# Patient Record
Sex: FEMALE | Race: BLACK OR AFRICAN AMERICAN | NOT HISPANIC OR LATINO | Employment: FULL TIME | URBAN - METROPOLITAN AREA
[De-identification: names, ages, dates, MRNs, and addresses within clinical notes are randomized per-mention and may not be internally consistent; named-entity substitution may affect disease eponyms.]

---

## 2017-01-12 ENCOUNTER — OFFICE VISIT (OUTPATIENT)
Dept: OPHTHALMOLOGY | Facility: CLINIC | Age: 22
End: 2017-01-12
Payer: MEDICAID

## 2017-01-12 DIAGNOSIS — Z96.1 PSEUDOPHAKIA OF BOTH EYES: Primary | ICD-10-CM

## 2017-01-12 PROCEDURE — 99999 PR PBB SHADOW E&M-EST. PATIENT-LVL II: CPT | Mod: PBBFAC,,, | Performed by: OPTOMETRIST

## 2017-01-12 PROCEDURE — 99212 OFFICE O/P EST SF 10 MIN: CPT | Mod: PBBFAC,PO | Performed by: OPTOMETRIST

## 2017-01-12 PROCEDURE — 99024 POSTOP FOLLOW-UP VISIT: CPT | Mod: ,,, | Performed by: OPTOMETRIST

## 2017-01-12 NOTE — PROGRESS NOTES
HPI     Last CPG exam 12/15/2016  Last [facility] exam 09/27/2016  5 week PCIOL Check  Pseudophakia, OU  Diabetic         Last edited by Grover Conti MA on 1/12/2017  8:25 AM.         Assessment /Plan     For exam results, see Encounter Report.    Pseudophakia of both eyes      5 weeks post op after cataract surgery.  PIOL stable OU.  MR and spec Rx updated.  Discussed the need for near and distance correction.  RTC 6 months or prn.

## 2017-01-12 NOTE — MR AVS SNAPSHOT
[facility]  [address] [place] [address]  Phone: [phone]  Fax: [phone]                  Rubi Jose   2017 8:00 AM   Office Visit    Description:  Female : 1995   Provider:  EM John, OD   Department:  [facility]           Reason for Visit     Post-op Evaluation           Diagnoses this Visit        Comments    Pseudophakia of both eyes    -  Primary            To Do List           Future Appointments        Provider Department Dept Phone    3/15/2017 1:00 PM Mansi Swain Jr., PA-C [facility] [phone]    2017 8:45 AM EM John, JOSE ALEJANDRO [facility] [phone]      Goals (5 Years of Data)     None      Follow-Up and Disposition     Return in about 6 months (around 2017).      [facility]     [facility] Nurse Care Line -  Assistance  Registered nurses in the [facility] provide clinical advisement, health education, appointment booking, and other advisory services.  Call for this free service at [phone].             Medications           Message regarding Medications     Verify the changes and/or additions to your medication regime listed below are the same as discussed with your clinician today.  If any of these changes or additions are incorrect, please notify your healthcare provider.             Verify that the below list of medications is an accurate representation of the medications you are currently taking.  If none reported, the list may be blank. If incorrect, please contact your healthcare provider. Carry this list with you in case of emergency.           Current Medications     amitriptyline (ELAVIL) 25 MG tablet Take 25 mg by mouth.    folic acid-vit B6-vit B12 2.5-25-2 mg (FOLBIC OR EQUIV) 2.5-25-2 mg Tab Take 1 tablet by mouth once daily.    gabapentin (NEURONTIN) 400 MG capsule Take 1 capsule (400 mg total) by mouth 3 (three) times daily.     insulin aspart (NOVOLOG) 100 unit/mL InPn pen Inject 5 Units into the skin 3 (three) times daily.    insulin glargine, TOUJEO, (TOUJEO) 300 unit/mL (1.5 mL) InPn pen Inject 55 Units into the skin every evening.           Clinical Reference Information           Vital Signs - Last Recorded     LMP                   12/26/2016 (Exact Date)           Allergies as of 1/12/2017     No Known Allergies      Immunizations Administered on Date of Encounter - 1/12/2017     None

## 2017-03-15 ENCOUNTER — TELEPHONE (OUTPATIENT)
Dept: DIABETES | Facility: CLINIC | Age: 22
End: 2017-03-15

## 2017-03-15 ENCOUNTER — OFFICE VISIT (OUTPATIENT)
Dept: DIABETES | Facility: CLINIC | Age: 22
End: 2017-03-15
Payer: MEDICAID

## 2017-03-15 VITALS — SYSTOLIC BLOOD PRESSURE: 138 MMHG | DIASTOLIC BLOOD PRESSURE: 90 MMHG | WEIGHT: 153 LBS | BODY MASS INDEX: 23.26 KG/M2

## 2017-03-15 DIAGNOSIS — E34.9 NEUROPATHY ASSOCIATED WITH ENDOCRINE DISORDER: ICD-10-CM

## 2017-03-15 DIAGNOSIS — G63 NEUROPATHY ASSOCIATED WITH ENDOCRINE DISORDER: ICD-10-CM

## 2017-03-15 DIAGNOSIS — E13.9 LADA (LATENT AUTOIMMUNE DIABETES IN ADULTS), MANAGED AS TYPE 1: ICD-10-CM

## 2017-03-15 PROCEDURE — 99212 OFFICE O/P EST SF 10 MIN: CPT | Mod: PBBFAC,PO | Performed by: PHYSICIAN ASSISTANT

## 2017-03-15 PROCEDURE — 99999 PR PBB SHADOW E&M-EST. PATIENT-LVL II: CPT | Mod: PBBFAC,,, | Performed by: PHYSICIAN ASSISTANT

## 2017-03-15 PROCEDURE — 99214 OFFICE O/P EST MOD 30 MIN: CPT | Mod: S$PBB,,, | Performed by: PHYSICIAN ASSISTANT

## 2017-03-15 RX ORDER — INSULIN GLARGINE 300 [IU]/ML
60 INJECTION, SOLUTION SUBCUTANEOUS NIGHTLY
Qty: 18 ML | Refills: 3 | Status: SHIPPED | OUTPATIENT
Start: 2017-03-15 | End: 2017-03-15 | Stop reason: SDUPTHER

## 2017-03-15 RX ORDER — INSULIN LISPRO 100 [IU]/ML
10 INJECTION, SOLUTION INTRAVENOUS; SUBCUTANEOUS
Qty: 9 ML | Refills: 11 | Status: SHIPPED | OUTPATIENT
Start: 2017-03-15 | End: 2017-09-24 | Stop reason: SDUPTHER

## 2017-03-15 RX ORDER — INSULIN LISPRO 100 [IU]/ML
10 INJECTION, SOLUTION INTRAVENOUS; SUBCUTANEOUS
Qty: 9 ML | Refills: 11 | Status: SHIPPED | OUTPATIENT
Start: 2017-03-15 | End: 2017-03-15 | Stop reason: SDUPTHER

## 2017-03-15 RX ORDER — CLOBETASOL PROPIONATE 0.5 MG/G
CREAM TOPICAL 2 TIMES DAILY
Qty: 15 G | Refills: 1 | Status: SHIPPED | OUTPATIENT
Start: 2017-03-15 | End: 2017-03-15 | Stop reason: SDUPTHER

## 2017-03-15 RX ORDER — CLOBETASOL PROPIONATE 0.5 MG/G
CREAM TOPICAL 2 TIMES DAILY
Qty: 15 G | Refills: 1 | Status: SHIPPED | OUTPATIENT
Start: 2017-03-15 | End: 2017-03-27 | Stop reason: CLARIF

## 2017-03-15 RX ORDER — INSULIN GLARGINE 300 [IU]/ML
60 INJECTION, SOLUTION SUBCUTANEOUS NIGHTLY
Qty: 18 ML | Refills: 3 | Status: ON HOLD | OUTPATIENT
Start: 2017-03-15 | End: 2017-08-03 | Stop reason: HOSPADM

## 2017-03-15 NOTE — PROGRESS NOTES
Subjective:      Patient ID: Rubi Jose is a 21 y.o. female.    PCP: Carla Stone NP      Rubi Jose is a pleasant 21 y.o. female presenting to follow up on diabetes mellitus. She has had diabetes for 3 or more years. Her last visit in Diabetes Management was 12/14/2016 Since that time she has had mild improvement in her symptoms. Her blood sugar range fasting has been 400+ and 2 hour post meal has been not testing, and she has been monitoring 1 times per day as directed. Her current concerns are glycemic control. She has been out of her rapid acting insulin for 48 hours    She reports ICU hospital admissions in 10/16 and again in 12/16 with emergency room visits. She denies hypoglycemia, syncope, diaphoresis, chest pain, or dyspnea.    She has gained 13 pounds since last visit. Her BMI is 23.26    Her blood sugar in the clinic today was:   Lab Results   Component Value Date    POCGLU 423 (A) 03/15/2017       We discussed the American diabetes Association recommendations:  hemoglobin A1c below 7.0%; all diabetics should be on statins unless contraindicated; one aspirin daily unless contraindicated; fasting blood sugar between 80 and 130 mg/dL; postprandial blood sugar below 180 mg/dl; prevention of hypoglycemia, may adjust goals to higher levels if persistent; ACE or ARB therapy if not contraindicated; and maintain in an ideal body weight with BMI below 25.    Rubi is noncompliant much of the time with DM medications.     Rubi is noncompliant much of the time with lifestyle modifications to include activity and meal planning.       STANDARDS OF CARE:  Eye doctor: Dr. Ennis, last exam 12/8/2016.  Dental exam: Recommend regular exams; denies gums bleeding.  Podiatry doctor: Dileep     ACTIVITY LEVEL: She exercises rarely.  MEAL PLANNING: Number of meals per day - 3. Number of snacks per day - 2.  Per dietary recall, patient is not limiting carbohydrates, saturated fats and sodium.   BLOOD  GLUCOSE TESTING: Self-monitoring with   SOCIAL HISTORY: single. Lives with partner / significant other. unemployed no tobacco use    The following results were reviewed with patient.    Lab Results   Component Value Date    WBC 5.40 12/27/2016    HGB 13.0 12/27/2016    HCT 37.2 12/27/2016     (H) 12/27/2016    CHOL 156 12/27/2016    TRIG 184 (H) 12/27/2016    HDL 43 12/27/2016    ALT 16 12/26/2016    AST 17 12/26/2016     12/28/2016    K 3.6 12/28/2016     12/28/2016    CREATININE 0.46 (L) 12/28/2016    ESTGFRAFRICA >60 12/28/2016    EGFRNONAA >60 12/28/2016    BUN 9 12/28/2016    CO2 21 (L) 12/28/2016    TSH 0.494 12/27/2016     (H) 12/28/2016       Lab Results   Component Value Date    HGBA1C 11.4 (H) 12/27/2016    HGBA1C 14.8 (H) 07/25/2016    HGBA1C 14.6 (H) 05/12/2016       Lab Results   Component Value Date    GLUTAMICACID 3.11 (H) 03/18/2016    CPEPTIDE 0.6 (L) 03/18/2016       Lab Results   Component Value Date    T3FREE 1.8 (L) 03/18/2016     Lab Results   Component Value Date    FREET4 1.11 03/18/2016     Lab Results   Component Value Date    TSH 0.494 12/27/2016     Lab Results   Component Value Date    THYROPEROXID <6.0 03/18/2016     Lab Results   Component Value Date    WCMGLYEW06 1065 (H) 09/13/2016       Lab Results   Component Value Date    CALCIUM 7.9 (L) 12/28/2016    PHOS 3.0 12/28/2016       Review of patient's allergies indicates:  No Known Allergies    Past Medical History:   Diagnosis Date    Cataract     Diabetes mellitus, type 2     GERD (gastroesophageal reflux disease)        Review of Systems   Constitutional: Negative.  Negative for activity change, appetite change, chills, diaphoresis, fatigue, fever and unexpected weight change.   HENT: Negative.  Negative for congestion, dental problem, drooling, ear discharge, ear pain, facial swelling, hearing loss, mouth sores, nosebleeds, postnasal drip, rhinorrhea, sinus pressure, sneezing, sore throat, tinnitus,  trouble swallowing and voice change.    Eyes: Negative.  Negative for photophobia, pain, discharge, redness, itching and visual disturbance.   Respiratory: Negative.  Negative for apnea, cough, choking, chest tightness, shortness of breath, wheezing and stridor.    Cardiovascular: Negative.  Negative for chest pain, palpitations and leg swelling.   Gastrointestinal: Negative.  Negative for abdominal distention, abdominal pain, anal bleeding, blood in stool, constipation, diarrhea, nausea, rectal pain and vomiting.   Endocrine: Negative.  Negative for cold intolerance, heat intolerance, polydipsia, polyphagia and polyuria.   Genitourinary: Negative.  Negative for decreased urine volume, difficulty urinating, dyspareunia, dysuria, enuresis, flank pain, frequency, genital sores, hematuria, menstrual problem, pelvic pain, urgency, vaginal bleeding, vaginal discharge and vaginal pain.   Musculoskeletal: Negative.  Negative for arthralgias, back pain, gait problem, joint swelling, myalgias, neck pain and neck stiffness.   Skin: Negative.  Negative for color change, pallor, rash and wound.   Allergic/Immunologic: Negative.  Negative for environmental allergies, food allergies and immunocompromised state.   Neurological: Negative.  Negative for dizziness, tremors, seizures, syncope, facial asymmetry, speech difficulty, weakness, light-headedness, numbness and headaches.   Hematological: Negative.  Negative for adenopathy. Does not bruise/bleed easily.   Psychiatric/Behavioral: Negative.  Negative for agitation, behavioral problems, confusion, decreased concentration, dysphoric mood, hallucinations, self-injury, sleep disturbance and suicidal ideas. The patient is not nervous/anxious and is not hyperactive.       Objective:     Vitals - 1 value per visit 12/28/2016 12/29/2016 3/15/2017   SYSTOLIC - 125 138   DIASTOLIC - 85 90   PULSE - 83 -   TEMPERATURE - 98.6 -   RESPIRATIONS - 19 -   SPO2 - 99 -   Weight (lb) 140.43 - 153    HEIGHT - - -   BODY MASS INDEX - - 23.26   VISIT REPORT - - -   Pain Score  - - 0       Physical Exam   Constitutional: She is oriented to person, place, and time. She appears well-developed and well-nourished. She is cooperative.  Non-toxic appearance. She does not have a sickly appearance. She does not appear ill. No distress. She is not intubated.   HENT:   Head: Normocephalic and atraumatic. Not macrocephalic and not microcephalic. Head is without raccoon's eyes, without Cisse's sign, without abrasion, without contusion, without laceration, without right periorbital erythema and without left periorbital erythema. Hair is normal.   Right Ear: Hearing, tympanic membrane, external ear and ear canal normal. No lacerations. No drainage, swelling or tenderness. No foreign bodies. No mastoid tenderness. Tympanic membrane is not injected, not scarred, not perforated, not erythematous, not retracted and not bulging. Tympanic membrane mobility is normal. No middle ear effusion. No hemotympanum. No decreased hearing is noted.   Left Ear: Hearing, tympanic membrane, external ear and ear canal normal. No lacerations. No drainage, swelling or tenderness. No foreign bodies. No mastoid tenderness. Tympanic membrane is not injected, not scarred, not perforated, not erythematous, not retracted and not bulging. Tympanic membrane mobility is normal.  No middle ear effusion. No hemotympanum. No decreased hearing is noted.   Nose: Nose normal. No mucosal edema, rhinorrhea, nose lacerations, sinus tenderness, nasal deformity, septal deviation or nasal septal hematoma. No epistaxis.  No foreign bodies. Right sinus exhibits no maxillary sinus tenderness and no frontal sinus tenderness. Left sinus exhibits no maxillary sinus tenderness and no frontal sinus tenderness.   Mouth/Throat: Oropharynx is clear and moist. No oropharyngeal exudate.   Eyes: Conjunctivae and EOM are normal. Pupils are equal, round, and reactive to light. Right  eye exhibits no chemosis, no discharge and no exudate. No foreign body present in the right eye. Left eye exhibits no chemosis, no discharge, no exudate and no hordeolum. No foreign body present in the left eye. Right conjunctiva is not injected. Right conjunctiva has no hemorrhage. Left conjunctiva is not injected. Left conjunctiva has no hemorrhage. No scleral icterus. Right eye exhibits normal extraocular motion and no nystagmus. Left eye exhibits normal extraocular motion and no nystagmus. Right pupil is round and reactive. Left pupil is round and reactive. Pupils are equal.   Neck: Trachea normal, normal range of motion and full passive range of motion without pain. Neck supple. Normal carotid pulses, no hepatojugular reflux and no JVD present. No tracheal tenderness, no spinous process tenderness and no muscular tenderness present. Carotid bruit is not present. No rigidity. No tracheal deviation, no edema, no erythema and normal range of motion present. No thyroid mass and no thyromegaly present.   Cardiovascular: Normal rate, regular rhythm, normal heart sounds and intact distal pulses.   No extrasystoles are present. PMI is not displaced.  Exam reveals no gallop, no friction rub and no decreased pulses.    No murmur heard.  Pulses:       Dorsalis pedis pulses are 2+ on the right side, and 2+ on the left side.        Posterior tibial pulses are 2+ on the right side, and 2+ on the left side.   Pulmonary/Chest: Effort normal and breath sounds normal. No accessory muscle usage or stridor. No apnea, no tachypnea and no bradypnea. She is not intubated. No respiratory distress. She has no decreased breath sounds. She has no wheezes. She has no rhonchi. She has no rales. Chest wall is not dull to percussion. She exhibits no mass, no tenderness, no bony tenderness, no laceration, no crepitus, no edema, no deformity, no swelling and no retraction.   Abdominal: Soft. Normal appearance and bowel sounds are normal. She  exhibits no shifting dullness, no distension, no pulsatile liver, no fluid wave, no abdominal bruit, no ascites, no pulsatile midline mass and no mass. There is no hepatosplenomegaly, splenomegaly or hepatomegaly. There is no tenderness. There is no rigidity, no rebound, no guarding, no CVA tenderness, no tenderness at McBurney's point and negative Juarez's sign.   Musculoskeletal: Normal range of motion. She exhibits no edema or tenderness.        Right foot: There is normal range of motion and no deformity.        Left foot: There is normal range of motion and no deformity.   Feet:   Right Foot:   Protective Sensation: 5 sites tested. 5 sites sensed.   Skin Integrity: Negative for ulcer, blister, skin breakdown, erythema, warmth, callus or dry skin.   Left Foot:   Protective Sensation: 5 sites tested. 5 sites sensed.   Skin Integrity: Negative for ulcer, blister, skin breakdown, erythema, warmth, callus or dry skin.   Lymphadenopathy:        Head (right side): No submental, no submandibular, no tonsillar, no preauricular, no posterior auricular and no occipital adenopathy present.        Head (left side): No submental, no submandibular, no tonsillar, no preauricular, no posterior auricular and no occipital adenopathy present.     She has no cervical adenopathy.        Right cervical: No superficial cervical, no deep cervical and no posterior cervical adenopathy present.       Left cervical: No superficial cervical, no deep cervical and no posterior cervical adenopathy present.     She has no axillary adenopathy.   Neurological: She is alert and oriented to person, place, and time. She has normal reflexes. She is not disoriented. She displays no atrophy, no tremor and normal reflexes. No cranial nerve deficit or sensory deficit. She exhibits normal muscle tone. She displays no seizure activity. Coordination and gait normal.   Reflex Scores:       Bicep reflexes are 2+ on the right side and 2+ on the left side.        Brachioradialis reflexes are 2+ on the right side and 2+ on the left side.       Patellar reflexes are 2+ on the right side and 2+ on the left side.  Skin: Skin is warm and dry. No abrasion, no bruising, no burn, no ecchymosis, no laceration, no lesion, no petechiae, no purpura and no rash noted. Rash is not macular, not papular, not maculopapular, not nodular, not pustular, not vesicular and not urticarial. She is not diaphoretic. No cyanosis or erythema. No pallor. Nails show no clubbing.   Psychiatric: She has a normal mood and affect. Her behavior is normal. Judgment and thought content normal. Her mood appears not anxious. Her affect is not angry, not blunt and not labile. Her speech is not rapid and/or pressured, not delayed, not tangential and not slurred. She is not agitated, not aggressive, not hyperactive, not slowed, not withdrawn, not actively hallucinating and not combative. Thought content is not paranoid and not delusional. Cognition and memory are not impaired. She does not express impulsivity or inappropriate judgment. She does not exhibit a depressed mood. She expresses no homicidal and no suicidal ideation. She expresses no suicidal plans and no homicidal plans. She is communicative. She exhibits normal recent memory and normal remote memory. She is attentive.   Nursing note and vitals reviewed.    Assessment:     1. Uncontrolled type 1 diabetes mellitus with diabetic polyneuropathy    2. 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 Neuropathy associated with endocrine disorder       Plan:   Rubi Jose is seen today for   1. Uncontrolled type 1 diabetes mellitus with diabetic polyneuropathy    2. 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 Neuropathy associated with endocrine disorder      We have discussed the etiology and treatment options associated with the diagnosis as well as alternatives. I have changed her Novolog to Humalog as per  formulary changes.  She has elected the following treatments.     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  Dispense: 15 g; Refill: 1  -     insulin lispro (HUMALOG KWIKPEN) 100 unit/mL InPn pen; Inject 10 Units into the skin 3 (three) times daily with meals.  Dispense: 9 mL; Refill: 11  -     insulin glargine, TOUJEO, (TOUJEO) 300 unit/mL (1.5 mL) InPn pen; Inject 60 Units into the skin every evening.  Dispense: 18 mL; Refill: 3    Neuropathy associated with endocrine disorder  -     insulin glargine, TOUJEO, (TOUJEO) 300 unit/mL (1.5 mL) InPn pen; Inject 60 Units into the skin every evening.  Dispense: 18 mL; Refill: 3    1.) Patient was instructed to monitor blood glucose twice daily, fasting, and 2 hour post meal; if on Multiple Daily Injections (MDI) she will need to have pre-meal blood glucose as well. Reminded to bring BG meter or record to each visit for review.  2.) Reviewed pathophysiology of type 2 diabetes, complications related to the disease, importance of annual dilated eye exam and self daily foot examination.  3.) Continue medications as prescribed Toujeo; Humalog. [facility] MyChart or Phone review in 1 week with BG records for adjustment of medication.  4.) Reviewed carb counting, portion control, importance of spacing meals throughout the day to prevent post prandial elevations. Recommended low saturated fat, low sodium diet to aid in control of hypertension and cholesterol.  5.) Discussed activity, benefits, methods, and precautions. Recommended patient start/continue some form of exercise and increase as tolerated to 60 minutes per day to facilitate weight loss and aid in control of BGs. Also reminded patient of WHO recommendation of 10,000 steps daily as a goal.   6.) A1C, TSH, Lipid Panel, CMP with eGFR and Micro/Creatinine per ADA protocol.  7.) Return to clinic in  1.5 months for follow up. Advised patient to call clinic with any questions or concerns.    A total of 40 minutes was spent in face to face time, of which 50 % was spent in counseling patient on disease process, complications, treatment, and side effects of medications.    The patient was explained the above plan and given opportunity to ask questions.  She understands, chooses and consents to this plan and accepts all the risks, which include but are not limited to the risks mentioned above.   She understands the alternative of having no testing, interventions or treatments at this time. She left content and without further questions.

## 2017-03-15 NOTE — TELEPHONE ENCOUNTER
----- Message from Rosario Maurice sent at 3/14/2017  3:58 PM CDT -----  Pt is requesting a call from nurse to discuss a refill on the humalaog stick pin.            Please call pt back at [phone]

## 2017-03-27 RX ORDER — CLOBETASOL PROPIONATE 0.5 MG/G
EMULSION TOPICAL
Qty: 15 G | Refills: 0 | Status: SHIPPED | OUTPATIENT
Start: 2017-03-27 | End: 2017-04-24 | Stop reason: SDUPTHER

## 2017-04-24 RX ORDER — CLOBETASOL PROPIONATE 0.5 MG/G
EMULSION TOPICAL
Qty: 15 G | Refills: 0 | Status: SHIPPED | OUTPATIENT
Start: 2017-04-24 | End: 2017-06-14 | Stop reason: ALTCHOICE

## 2017-06-14 ENCOUNTER — OFFICE VISIT (OUTPATIENT)
Dept: DIABETES | Facility: CLINIC | Age: 22
End: 2017-06-14
Payer: MEDICAID

## 2017-06-14 VITALS
DIASTOLIC BLOOD PRESSURE: 72 MMHG | HEIGHT: 68 IN | SYSTOLIC BLOOD PRESSURE: 106 MMHG | WEIGHT: 159.63 LBS | BODY MASS INDEX: 24.19 KG/M2

## 2017-06-14 DIAGNOSIS — E13.9 LADA (LATENT AUTOIMMUNE DIABETES OF ADULTHOOD), MANAGED AS TYPE 1: Primary | ICD-10-CM

## 2017-06-14 LAB — GLUCOSE SERPL-MCNC: 82 MG/DL (ref 70–110)

## 2017-06-14 PROCEDURE — 82948 REAGENT STRIP/BLOOD GLUCOSE: CPT | Mod: PBBFAC,PO | Performed by: PHYSICIAN ASSISTANT

## 2017-06-14 PROCEDURE — 99999 PR PBB SHADOW E&M-EST. PATIENT-LVL III: CPT | Mod: PBBFAC,,, | Performed by: PHYSICIAN ASSISTANT

## 2017-06-14 PROCEDURE — 99213 OFFICE O/P EST LOW 20 MIN: CPT | Mod: PBBFAC,PO | Performed by: PHYSICIAN ASSISTANT

## 2017-06-14 PROCEDURE — 99214 OFFICE O/P EST MOD 30 MIN: CPT | Mod: S$PBB,,, | Performed by: PHYSICIAN ASSISTANT

## 2017-06-14 PROCEDURE — 3046F HEMOGLOBIN A1C LEVEL >9.0%: CPT | Mod: ,,, | Performed by: PHYSICIAN ASSISTANT

## 2017-06-14 RX ORDER — FLUOCINONIDE CREAM (EMULSIFIED BASE) 0.5 MG/G
CREAM TOPICAL 2 TIMES DAILY
Qty: 30 G | Refills: 1 | Status: SHIPPED | OUTPATIENT
Start: 2017-06-14 | End: 2017-07-30

## 2017-06-14 NOTE — PROGRESS NOTES
Subjective:      Patient ID: Rubi Jose is a 21 y.o. female.    PCP: Carla Stone NP      Rubi Jose is a pleasant 21 y.o. female presenting to follow up on diabetes mellitus. She has had diabetes for 3 or more years. Her last visit in Diabetes Management was 3/15/2017 Since that time she has had moderate improvement in her glycemia. Her blood sugar range fasting has been 165-180 and 2 hour post meal has been 200-300+, and she has been monitoring 2 times per day as directed. Her current concerns are glycemic control and medication refill.    She denies any hospital admissions, emergency room visits, hypoglycemia, syncope, diaphoresis, chest pain, or dyspnea.    She has gained 6 pounds since last visit. Her BMI is 24.27    Her blood sugar in the clinic today was:   Lab Results   Component Value Date    POCGLU 82 06/14/2017       We discussed the American diabetes Association recommendations:  hemoglobin A1c below 7.0%; all diabetics should be on statins unless contraindicated; one aspirin daily unless contraindicated; fasting blood sugar between 80 and 130 mg/dL; postprandial blood sugar below 180 mg/dl; prevention of hypoglycemia, may adjust goals to higher levels if persistent; ACE or ARB therapy if not contraindicated; and maintain in an ideal body weight with BMI below 25.    Rubi is compliant most of the time with DM medications.     Rubi is compliant most of the time with lifestyle modifications to include activity and meal planning.       STANDARDS OF CARE:  Eye doctor: Dr. Ennis, last exam 12/8/2016.  Dental exam: Recommend regular exams; denies gums bleeding.  Podiatry doctor: Dileep     ACTIVITY LEVEL: She exercises rarely.  MEAL PLANNING: Number of meals per day - 3. Number of snacks per day - 2.  Per dietary recall, patient is not limiting carbohydrates, saturated fats and sodium.   BLOOD GLUCOSE TESTING: Self-monitoring with   SOCIAL HISTORY: single. Lives with partner /  significant other. unemployed no tobacco use    The following results were reviewed with patient.    Lab Results   Component Value Date    WBC 5.40 12/27/2016    HGB 13.0 12/27/2016    HCT 37.2 12/27/2016     (H) 12/27/2016    CHOL 156 12/27/2016    TRIG 184 (H) 12/27/2016    HDL 43 12/27/2016    ALT 16 12/26/2016    AST 17 12/26/2016     12/28/2016    K 3.6 12/28/2016     12/28/2016    CREATININE 0.46 (L) 12/28/2016    ESTGFRAFRICA >60 12/28/2016    EGFRNONAA >60 12/28/2016    BUN 9 12/28/2016    CO2 21 (L) 12/28/2016    TSH 0.494 12/27/2016     (H) 12/28/2016       Lab Results   Component Value Date    HGBA1C 11.4 (H) 12/27/2016    HGBA1C 14.8 (H) 07/25/2016    HGBA1C 14.6 (H) 05/12/2016       Lab Results   Component Value Date    GLUTAMICACID 3.11 (H) 03/18/2016    CPEPTIDE 0.6 (L) 03/18/2016       Lab Results   Component Value Date    T3FREE 1.8 (L) 03/18/2016     Lab Results   Component Value Date    FREET4 1.11 03/18/2016     Lab Results   Component Value Date    TSH 0.494 12/27/2016     Lab Results   Component Value Date    THYROPEROXID <6.0 03/18/2016     Lab Results   Component Value Date    MSDSYBSD80 1065 (H) 09/13/2016     Lab Results   Component Value Date    CALCIUM 7.9 (L) 12/28/2016    PHOS 3.0 12/28/2016       Review of patient's allergies indicates:  No Known Allergies    Past Medical History:   Diagnosis Date    Cataract     Diabetes mellitus, type 2     GERD (gastroesophageal reflux disease)        Review of Systems   Constitutional: Negative.  Negative for activity change, appetite change, chills, diaphoresis, fatigue, fever and unexpected weight change.   HENT: Negative.  Negative for congestion, dental problem, drooling, ear discharge, ear pain, facial swelling, hearing loss, mouth sores, nosebleeds, postnasal drip, rhinorrhea, sinus pressure, sneezing, sore throat, tinnitus, trouble swallowing and voice change.    Eyes: Negative.  Negative for photophobia, pain,  "discharge, redness, itching and visual disturbance.   Respiratory: Negative.  Negative for apnea, cough, choking, chest tightness, shortness of breath, wheezing and stridor.    Cardiovascular: Negative.  Negative for chest pain, palpitations and leg swelling.   Gastrointestinal: Negative.  Negative for abdominal distention, abdominal pain, anal bleeding, blood in stool, constipation, diarrhea, nausea, rectal pain and vomiting.   Endocrine: Negative.  Negative for cold intolerance, heat intolerance, polydipsia, polyphagia and polyuria.   Genitourinary: Negative.  Negative for decreased urine volume, difficulty urinating, dyspareunia, dysuria, enuresis, flank pain, frequency, genital sores, hematuria, menstrual problem, pelvic pain, urgency, vaginal bleeding, vaginal discharge and vaginal pain.   Musculoskeletal: Negative.  Negative for arthralgias, back pain, gait problem, joint swelling, myalgias, neck pain and neck stiffness.   Skin: Negative.  Negative for color change, pallor, rash and wound.   Allergic/Immunologic: Negative.  Negative for environmental allergies, food allergies and immunocompromised state.   Neurological: Positive for numbness. Negative for dizziness, tremors, seizures, syncope, facial asymmetry, speech difficulty, weakness, light-headedness and headaches.   Hematological: Negative.  Negative for adenopathy. Does not bruise/bleed easily.   Psychiatric/Behavioral: Negative.  Negative for agitation, behavioral problems, confusion, decreased concentration, dysphoric mood, hallucinations, self-injury, sleep disturbance and suicidal ideas. The patient is not nervous/anxious and is not hyperactive.       Objective:     Vitals - 1 value per visit 12/29/2016 3/15/2017 6/14/2017   SYSTOLIC 125 138 106   DIASTOLIC 85 90 72   PULSE 83 - -   TEMPERATURE 98.6 - -   RESPIRATIONS 19 - -   SPO2 99 - -   Weight (lb) - 153 159.61   Weight (kg) - 69.4 72.4   HEIGHT - - 5' 8"   BODY MASS INDEX - 23.26 24.27   VISIT " REPORT - - -   Pain Score  - 0 -       Physical Exam   Constitutional: She is oriented to person, place, and time. She appears well-developed and well-nourished. She is cooperative.  Non-toxic appearance. She does not have a sickly appearance. She does not appear ill. No distress. She is not intubated.   HENT:   Head: Normocephalic and atraumatic. Not macrocephalic and not microcephalic. Head is without raccoon's eyes, without Cisse's sign, without abrasion, without contusion, without laceration, without right periorbital erythema and without left periorbital erythema. Hair is normal.   Right Ear: Hearing, tympanic membrane, external ear and ear canal normal. No lacerations. No drainage, swelling or tenderness. No foreign bodies. No mastoid tenderness. Tympanic membrane is not injected, not scarred, not perforated, not erythematous, not retracted and not bulging. Tympanic membrane mobility is normal. No middle ear effusion. No hemotympanum. No decreased hearing is noted.   Left Ear: Hearing, tympanic membrane, external ear and ear canal normal. No lacerations. No drainage, swelling or tenderness. No foreign bodies. No mastoid tenderness. Tympanic membrane is not injected, not scarred, not perforated, not erythematous, not retracted and not bulging. Tympanic membrane mobility is normal.  No middle ear effusion. No hemotympanum. No decreased hearing is noted.   Nose: Nose normal. No mucosal edema, rhinorrhea, nose lacerations, sinus tenderness, nasal deformity, septal deviation or nasal septal hematoma. No epistaxis.  No foreign bodies. Right sinus exhibits no maxillary sinus tenderness and no frontal sinus tenderness. Left sinus exhibits no maxillary sinus tenderness and no frontal sinus tenderness.   Mouth/Throat: Oropharynx is clear and moist. No oropharyngeal exudate.   Eyes: Conjunctivae and EOM are normal. Pupils are equal, round, and reactive to light. Right eye exhibits no chemosis, no discharge and no  exudate. No foreign body present in the right eye. Left eye exhibits no chemosis, no discharge, no exudate and no hordeolum. No foreign body present in the left eye. Right conjunctiva is not injected. Right conjunctiva has no hemorrhage. Left conjunctiva is not injected. Left conjunctiva has no hemorrhage. No scleral icterus. Right eye exhibits normal extraocular motion and no nystagmus. Left eye exhibits normal extraocular motion and no nystagmus. Right pupil is round and reactive. Left pupil is round and reactive. Pupils are equal.   Fundoscopic exam:       The right eye shows no arteriolar narrowing, no AV nicking, no exudate, no hemorrhage and no papilledema. The right eye shows red reflex and venous pulsations.        The left eye shows no arteriolar narrowing, no AV nicking, no exudate, no hemorrhage and no papilledema. The left eye shows red reflex and venous pulsations.   Neck: Trachea normal, normal range of motion and full passive range of motion without pain. Neck supple. Normal carotid pulses, no hepatojugular reflux and no JVD present. No tracheal tenderness, no spinous process tenderness and no muscular tenderness present. Carotid bruit is not present. No no neck rigidity. No tracheal deviation, no edema, no erythema and normal range of motion present. No thyroid mass and no thyromegaly present.   Cardiovascular: Normal rate, regular rhythm, normal heart sounds and intact distal pulses.   No extrasystoles are present. PMI is not displaced.  Exam reveals no gallop, no friction rub and no decreased pulses.    No murmur heard.  Pulses:       Dorsalis pedis pulses are 2+ on the right side, and 2+ on the left side.        Posterior tibial pulses are 2+ on the right side, and 2+ on the left side.   Pulmonary/Chest: Effort normal and breath sounds normal. No accessory muscle usage or stridor. No apnea, no tachypnea and no bradypnea. She is not intubated. No respiratory distress. She has no decreased breath  sounds. She has no wheezes. She has no rhonchi. She has no rales. Chest wall is not dull to percussion. She exhibits no mass, no tenderness, no bony tenderness, no laceration, no crepitus, no edema, no deformity, no swelling and no retraction.   Abdominal: Soft. Normal appearance and bowel sounds are normal. She exhibits no shifting dullness, no distension, no pulsatile liver, no fluid wave, no abdominal bruit, no ascites, no pulsatile midline mass and no mass. There is no hepatosplenomegaly, splenomegaly or hepatomegaly. There is no tenderness. There is no rigidity, no rebound, no guarding, no CVA tenderness, no tenderness at McBurney's point and negative Juarez's sign.   Musculoskeletal: Normal range of motion. She exhibits no edema or tenderness.        Right foot: There is normal range of motion and no deformity.        Left foot: There is normal range of motion and no deformity.   Feet:   Right Foot:   Protective Sensation: 5 sites tested. 5 sites sensed.   Skin Integrity: Negative for ulcer, blister, skin breakdown, erythema, warmth, callus or dry skin.   Left Foot:   Protective Sensation: 5 sites tested. 5 sites sensed.   Skin Integrity: Negative for ulcer, blister, skin breakdown, erythema, warmth, callus or dry skin.   Lymphadenopathy:        Head (right side): No submental, no submandibular, no tonsillar, no preauricular, no posterior auricular and no occipital adenopathy present.        Head (left side): No submental, no submandibular, no tonsillar, no preauricular, no posterior auricular and no occipital adenopathy present.     She has no cervical adenopathy.        Right cervical: No superficial cervical, no deep cervical and no posterior cervical adenopathy present.       Left cervical: No superficial cervical, no deep cervical and no posterior cervical adenopathy present.     She has no axillary adenopathy.   Neurological: She is alert and oriented to person, place, and time. She has normal reflexes.  She is not disoriented. She displays no atrophy, no tremor and normal reflexes. No cranial nerve deficit or sensory deficit. She exhibits normal muscle tone. She displays no seizure activity. Coordination and gait normal.   Reflex Scores:       Bicep reflexes are 2+ on the right side and 2+ on the left side.       Brachioradialis reflexes are 2+ on the right side and 2+ on the left side.       Patellar reflexes are 2+ on the right side and 2+ on the left side.  Skin: Skin is warm and dry. No abrasion, no bruising, no burn, no ecchymosis, no laceration, no lesion, no petechiae, no purpura and no rash noted. Rash is not macular, not papular, not maculopapular, not nodular, not pustular, not vesicular and not urticarial. She is not diaphoretic. No cyanosis or erythema. No pallor. Nails show no clubbing.   Psychiatric: She has a normal mood and affect. Her behavior is normal. Judgment and thought content normal. Her mood appears not anxious. Her affect is not angry, not blunt and not labile. Her speech is not rapid and/or pressured, not delayed, not tangential and not slurred. She is not agitated, not aggressive, not hyperactive, not slowed, not withdrawn, not actively hallucinating and not combative. Thought content is not paranoid and not delusional. Cognition and memory are not impaired. She does not express impulsivity or inappropriate judgment. She does not exhibit a depressed mood. She expresses no homicidal and no suicidal ideation. She expresses no suicidal plans and no homicidal plans. She is communicative. She exhibits normal recent memory and normal remote memory. She is attentive.   Nursing note and vitals reviewed.    Assessment:     1. BARBARA (latent autoimmune diabetes of adulthood), managed as type 1       Plan:   Rubi Jose is seen today for   1. BARBARA (latent autoimmune diabetes of adulthood), managed as type 1      We have discussed the etiology and treatment options associated with the diagnosis as  "well as alternatives. She has elected the following treatments.     BARBARA (latent autoimmune diabetes of adulthood), managed as type 1  -     POCT glucose  -     Hemoglobin A1c; Future; Expected date: 06/14/2017      1.) Patient was instructed to monitor blood glucose twice daily, fasting, and 2 hour post meal; if on Multiple Daily Injections (MDI) she will need to have pre-meal blood glucose as well. Reminded to bring BG meter or record to each visit for review.  2.) Reviewed pathophysiology of type 2 diabetes, complications related to the disease, importance of annual dilated eye exam and self daily foot examination.  3.) Continue medications as prescribed Humalog and Toujeo. [facility] MyChart or Phone review in 1 week with BG records for adjustment of medication.  4.) Reviewed carb counting, portion control, importance of spacing meals throughout the day to prevent post prandial elevations. Recommended low saturated fat, low sodium diet to aid in control of hypertension and cholesterol.  5.) Discussed activity, benefits, methods, and precautions. Recommended patient start/continue some form of exercise and increase as tolerated to 60 minutes per day to facilitate weight loss and aid in control of BGs. Also reminded patient of WHO recommendation of 10,000 steps daily as a goal.   6.) A1C, TSH, Lipid Panel, CMP with eGFR and Micro/Creatinine per ADA protocol.  7.) Return to clinic in 3 months for follow up. Advised patient to call clinic with any questions or concerns.     I have reviewed your results and they are still quite high. I would like you to start "Treating to Target". The treatment will be Insulin and your target will be the Fasting and 2 hour post meal blood sugar. It will work in this manner;    1. Goal for Fasting blood sugar is  mg/dl. I realize that you will need time to adjust to the new levels and presently you may feel too low if you are too aggressive now. So go slow and aim to lower your " "blood sugar to below 200 then 150 then 100 over several months.    2. Goal for 2 hour post meal blood sugar is below 180 mg/dl, here the same rules apply as in #1.    3. You will check your fasting blood sugar daily, if not where we would like it to be over a 3 day period then that evening we will increase the Toujeo dose by 5 units. Then repeat the process over the next 3 days. Remember this is a slow process and take our time getting to goal. But, each week should be better than prior weeks. Blood sugars below 70 are unacceptable and should raise a "RED FLAG" where we may have to reduce our dose of insulin.    4. You will check your post meal glucose daily as well. However, each day you will check a different meal, (ie. Monday-breakfast; Tuesday- lunch; Wednesday- supper, then repeat). If your post meal glucose is not where we would like, increase pre-meal insulin by 2 units next time. A word of CAUTION: mealtime insulin is dependant on the size and concentration of your meal content. If not consuming a large meal do not take large dose of insulin. Use the reasonable person rule.     5. If you have any questions please do not hesitate to call.      Intensive insulin Therapy with correction factor:    You are on Intensive insulin therapy with Basal and Bolus insulin. Lantus, Levamir or NPH is your Basal insulin and will help maintain your fasting and between meal sugar. Your fast acting or rescue insulin is either Humalog, Novalog or Regular insulin and will control your post meal sugar.     You will Take 65 units of Toujeo at 9 pm each night. This will be adjusted up or down depending on your fasting blood sugar before breakfast.    Humalog will follow this pre meal schedule; Correction factor of "2 units per 50 mg/dl" and is based on 30-60 grams of carbohydrates per meal.    If blood sugar is below 70 eat first then check your blood sugar 2 hours later and make correction.  If blood pre-meal sugar is  70 -150 " take 10 units of Humalog;  If blood pre-meal sugar is 151-200 take +2 units of Humalog;  If blood pre-meal sugar is 201-250 take +4 units of Humalog;  If blood pre-meal sugar is 251-300 take +6 units of Humalog;  If blood pre-meal sugar is 301-350 take +8 units of Humalog;  If blood pre-meal sugar is 351-400+ take +10 units of Humalog;  Also increase water intake and call for appointment.    A total of 40 minutes was spent in face to face time, of which 50 % was spent in counseling patient on disease process, complications, treatment, and side effects of medications.    The patient was explained the above plan and given opportunity to ask questions.  She understands, chooses and consents to this plan and accepts all the risks, which include but are not limited to the risks mentioned above.   She understands the alternative of having no testing, interventions or treatments at this time. She left content and without further questions.

## 2017-06-22 NOTE — PATIENT INSTRUCTIONS
" I have reviewed your results and they are still quite high. I would like you to start "Treating to Target". The treatment will be Insulin and your target will be the Fasting and 2 hour post meal blood sugar. It will work in this manner;    1. Goal for Fasting blood sugar is  mg/dl. I realize that you will need time to adjust to the new levels and presently you may feel too low if you are too aggressive now. So go slow and aim to lower your blood sugar to below 200 then 150 then 100 over several months.    2. Goal for 2 hour post meal blood sugar is below 180 mg/dl, here the same rules apply as in #1.    3. You will check your fasting blood sugar daily, if not where we would like it to be over a 3 day period then that evening we will increase the Toujeo dose by 5 units. Then repeat the process over the next 3 days. Remember this is a slow process and take our time getting to goal. But, each week should be better than prior weeks. Blood sugars below 70 are unacceptable and should raise a "RED FLAG" where we may have to reduce our dose of insulin.    4. You will check your post meal glucose daily as well. However, each day you will check a different meal, (ie. Monday-breakfast; Tuesday- lunch; Wednesday- supper, then repeat). If your post meal glucose is not where we would like, increase pre-meal insulin by 2 units next time. A word of CAUTION: mealtime insulin is dependant on the size and concentration of your meal content. If not consuming a large meal do not take large dose of insulin. Use the reasonable person rule.     5. If you have any questions please do not hesitate to call.      Intensive insulin Therapy with correction factor:    You are on Intensive insulin therapy with Basal and Bolus insulin. Lantus, Levamir or NPH is your Basal insulin and will help maintain your fasting and between meal sugar. Your fast acting or rescue insulin is either Humalog, Novalog or Regular insulin and will control your " "post meal sugar.     You will Take 65 units of Toujeo at 9 pm each night. This will be adjusted up or down depending on your fasting blood sugar before breakfast.    Humalog will follow this pre meal schedule; Correction factor of "2 units per 50 mg/dl" and is based on 30-60 grams of carbohydrates per meal.    If blood sugar is below 70 eat first then check your blood sugar 2 hours later and make correction.  If blood pre-meal sugar is  70 -150 take 10 units of Humalog;  If blood pre-meal sugar is 151-200 take +2 units of Humalog;  If blood pre-meal sugar is 201-250 take +4 units of Humalog;  If blood pre-meal sugar is 251-300 take +6 units of Humalog;  If blood pre-meal sugar is 301-350 take +8 units of Humalog;  If blood pre-meal sugar is 351-400+ take +10 units of Humalog;  Also increase water intake and call for appointment.    "

## 2017-07-13 ENCOUNTER — OFFICE VISIT (OUTPATIENT)
Dept: OPHTHALMOLOGY | Facility: CLINIC | Age: 22
End: 2017-07-13
Payer: MEDICAID

## 2017-07-13 DIAGNOSIS — Z96.1 PSEUDOPHAKIA OF BOTH EYES: Primary | ICD-10-CM

## 2017-07-13 PROCEDURE — 92014 COMPRE OPH EXAM EST PT 1/>: CPT | Mod: S$PBB,,, | Performed by: OPTOMETRIST

## 2017-07-13 PROCEDURE — 99212 OFFICE O/P EST SF 10 MIN: CPT | Mod: PBBFAC,PO | Performed by: OPTOMETRIST

## 2017-07-13 PROCEDURE — 99999 PR PBB SHADOW E&M-EST. PATIENT-LVL II: CPT | Mod: PBBFAC,,, | Performed by: OPTOMETRIST

## 2017-07-13 NOTE — PROGRESS NOTES
HPI     Last [facility] exam 01/12/2017  6 month PCIOL check  Pseudophakia, OU  No visual complaints     Diabetic/IDDM   07/12/2017    Last edited by Grover Conti MA on 7/13/2017  8:59 AM. (History)            Assessment /Plan     For exam results, see Encounter Report.    Pseudophakia of both eyes      Stable at 6 months after bilateral cataract surgery.  Spec Rx given.  RTC one year.

## 2017-07-31 PROBLEM — R06.00 DYSPNEA: Status: ACTIVE | Noted: 2017-07-31

## 2017-07-31 PROBLEM — R06.00 DYSPNEA: Status: RESOLVED | Noted: 2017-07-31 | Resolved: 2017-07-31

## 2017-09-13 NOTE — PROGRESS NOTES
Subjective:      Patient ID: Rubi Jose is a 21 y.o. female.    PCP: Carla Stone NP      Rubi Jose is a pleasant 21 y.o. female presenting to follow up on diabetes mellitus. She has had diabetes for 4 or more years. Her last visit in Diabetes Management was 6/14/2017 Since that time she has had no improvement in her glycemia. Her blood sugar range fasting has been  and 2 hour post meal has been , and she has been monitoring 0-2 times per day as directed. Her current concerns are glycemic control.      She denies any hospital admissions, emergency room visits, hypoglycemia, syncope, diaphoresis, chest pain, or dyspnea.    She has lost 1 pounds since last visit. Her BMI is 24.14    Her blood sugar in the clinic today was:   Lab Results   Component Value Date    POCGLU 286 (A) 09/14/2017       We discussed the American diabetes Association recommendations:  hemoglobin A1c below 7.0%; all diabetics should be on statins unless contraindicated; one aspirin daily unless contraindicated; fasting blood sugar between 80 and 130 mg/dL; postprandial blood sugar below 180 mg/dl; prevention of hypoglycemia, may adjust goals to higher levels if persistent; ACE or ARB therapy if not contraindicated; and maintain in an ideal body weight with BMI below 25.    Rubi is noncompliant some of the time with DM medications.     Rubi is noncompliant much of the time with lifestyle modifications to include activity and meal planning.       STANDARDS OF CARE:  Eye doctor: Dr. Ennis, last exam 7/12/2017.  Dental exam: Recommend regular exams; denies gums bleeding.  Podiatry doctor: Dileep     ACTIVITY LEVEL: She exercises rarely.  MEAL PLANNING: Number of meals per day - 3. Number of snacks per day - 2.  Per dietary recall, patient is not limiting carbohydrates, saturated fats and sodium.   BLOOD GLUCOSE TESTING: Self-monitoring with   SOCIAL HISTORY: single. Lives with partner / significant other.  unemployed no tobacco use    The following results were reviewed with patient.    Lab Results   Component Value Date    WBC 4.54 07/31/2017    HGB 11.2 (L) 07/31/2017    HCT 34.1 (L) 07/31/2017     07/31/2017    CHOL 116 (L) 07/31/2017    TRIG 182 (H) 07/31/2017    HDL 50 07/31/2017    ALT 29 07/30/2017    AST 41 (H) 07/30/2017     08/02/2017    K 3.2 (L) 08/02/2017     08/02/2017    CREATININE 0.50 08/02/2017    ESTGFRAFRICA >60 08/02/2017    EGFRNONAA >60 08/02/2017    BUN 11 08/02/2017    CO2 27 08/02/2017    TSH 1.420 07/30/2017    GLU 94 08/02/2017       Lab Results   Component Value Date    HGBA1C 13.6 (H) 07/31/2017    HGBA1C 11.4 (H) 12/27/2016    HGBA1C 14.8 (H) 07/25/2016       Lab Results   Component Value Date    GLUTAMICACID 3.11 (H) 03/18/2016    CPEPTIDE 0.6 (L) 03/18/2016       Lab Results   Component Value Date    T3FREE 1.8 (L) 03/18/2016     Lab Results   Component Value Date    FREET4 1.11 03/18/2016     Lab Results   Component Value Date    TSH 1.420 07/30/2017     Lab Results   Component Value Date    THYROPEROXID <6.0 03/18/2016     Lab Results   Component Value Date    LRUYRYSO86 1065 (H) 09/13/2016     Lab Results   Component Value Date    CALCIUM 9.1 08/02/2017    PHOS 4.8 (H) 08/01/2017       Review of patient's allergies indicates:  No Known Allergies    Past Medical History:   Diagnosis Date    Cataract     Diabetes mellitus, type 2     GERD (gastroesophageal reflux disease)        Review of Systems   Constitutional: Negative.  Negative for activity change, appetite change, chills, diaphoresis, fatigue, fever and unexpected weight change.   HENT: Negative.  Negative for congestion, dental problem, drooling, ear discharge, ear pain, facial swelling, hearing loss, mouth sores, nosebleeds, postnasal drip, rhinorrhea, sinus pressure, sneezing, sore throat, tinnitus, trouble swallowing and voice change.    Eyes: Negative.  Negative for photophobia, pain, discharge,  "redness, itching and visual disturbance.   Respiratory: Negative.  Negative for apnea, cough, choking, chest tightness, shortness of breath, wheezing and stridor.    Cardiovascular: Negative.  Negative for chest pain, palpitations and leg swelling.   Gastrointestinal: Negative.  Negative for abdominal distention, abdominal pain, anal bleeding, blood in stool, constipation, diarrhea, nausea, rectal pain and vomiting.   Endocrine: Negative.  Negative for cold intolerance, heat intolerance, polydipsia, polyphagia and polyuria.   Genitourinary: Negative.  Negative for decreased urine volume, difficulty urinating, dyspareunia, dysuria, enuresis, flank pain, frequency, genital sores, hematuria, menstrual problem, pelvic pain, urgency, vaginal bleeding, vaginal discharge and vaginal pain.   Musculoskeletal: Negative.  Negative for arthralgias, back pain, gait problem, joint swelling, myalgias, neck pain and neck stiffness.   Skin: Negative.  Negative for color change, pallor, rash and wound.   Allergic/Immunologic: Negative.  Negative for environmental allergies, food allergies and immunocompromised state.   Neurological: Negative.  Negative for dizziness, tremors, seizures, syncope, facial asymmetry, speech difficulty, weakness, light-headedness, numbness and headaches.   Hematological: Negative.  Negative for adenopathy. Does not bruise/bleed easily.   Psychiatric/Behavioral: Negative.  Negative for agitation, behavioral problems, confusion, decreased concentration, dysphoric mood, hallucinations, self-injury, sleep disturbance and suicidal ideas. The patient is not nervous/anxious and is not hyperactive.       Objective:     Vitals - 1 value per visit 7/31/2017 8/3/2017 9/14/2017   SYSTOLIC - 114 108   DIASTOLIC - 70 80   PULSE - 68 -   TEMPERATURE - 98.3 -   RESPIRATIONS - 16 -   SPO2 - 98 -   Weight (lb) 154.32 - 158.73   Weight (kg) 70 - 72   HEIGHT 5' 8" - 5' 8"   BODY MASS INDEX 23.46 - 24.14   VISIT REPORT - - - "   Pain Score  - - 0   Some recent data might be hidden       Physical Exam   Constitutional: She is oriented to person, place, and time. She appears well-developed and well-nourished. She is cooperative.  Non-toxic appearance. She does not have a sickly appearance. She does not appear ill. No distress. She is not intubated.   HENT:   Head: Normocephalic and atraumatic. Not macrocephalic and not microcephalic. Head is without raccoon's eyes, without Cisse's sign, without abrasion, without contusion, without laceration, without right periorbital erythema and without left periorbital erythema. Hair is normal.   Right Ear: Hearing, tympanic membrane, external ear and ear canal normal. No lacerations. No drainage, swelling or tenderness. No foreign bodies. No mastoid tenderness. Tympanic membrane is not injected, not scarred, not perforated, not erythematous, not retracted and not bulging. Tympanic membrane mobility is normal. No middle ear effusion. No hemotympanum. No decreased hearing is noted.   Left Ear: Hearing, tympanic membrane, external ear and ear canal normal. No lacerations. No drainage, swelling or tenderness. No foreign bodies. No mastoid tenderness. Tympanic membrane is not injected, not scarred, not perforated, not erythematous, not retracted and not bulging. Tympanic membrane mobility is normal.  No middle ear effusion. No hemotympanum. No decreased hearing is noted.   Nose: Nose normal. No mucosal edema, rhinorrhea, nose lacerations, sinus tenderness, nasal deformity, septal deviation or nasal septal hematoma. No epistaxis.  No foreign bodies. Right sinus exhibits no maxillary sinus tenderness and no frontal sinus tenderness. Left sinus exhibits no maxillary sinus tenderness and no frontal sinus tenderness.   Mouth/Throat: Oropharynx is clear and moist. No oropharyngeal exudate.   Eyes: Conjunctivae and EOM are normal. Pupils are equal, round, and reactive to light. Right eye exhibits no chemosis, no  discharge and no exudate. No foreign body present in the right eye. Left eye exhibits no chemosis, no discharge, no exudate and no hordeolum. No foreign body present in the left eye. Right conjunctiva is not injected. Right conjunctiva has no hemorrhage. Left conjunctiva is not injected. Left conjunctiva has no hemorrhage. No scleral icterus. Right eye exhibits normal extraocular motion and no nystagmus. Left eye exhibits normal extraocular motion and no nystagmus. Right pupil is round and reactive. Left pupil is round and reactive. Pupils are equal.   Neck: Trachea normal, normal range of motion and full passive range of motion without pain. Neck supple. Normal carotid pulses, no hepatojugular reflux and no JVD present. No tracheal tenderness, no spinous process tenderness and no muscular tenderness present. Carotid bruit is not present. No neck rigidity. No tracheal deviation, no edema, no erythema and normal range of motion present. No thyroid mass and no thyromegaly present.   Cardiovascular: Normal rate, regular rhythm, normal heart sounds and intact distal pulses.   No extrasystoles are present. PMI is not displaced.  Exam reveals no gallop, no friction rub and no decreased pulses.    No murmur heard.  Pulses:       Dorsalis pedis pulses are 2+ on the right side, and 2+ on the left side.        Posterior tibial pulses are 2+ on the right side, and 2+ on the left side.   Pulmonary/Chest: Effort normal and breath sounds normal. No accessory muscle usage or stridor. No apnea, no tachypnea and no bradypnea. She is not intubated. No respiratory distress. She has no decreased breath sounds. She has no wheezes. She has no rhonchi. She has no rales. Chest wall is not dull to percussion. She exhibits no mass, no tenderness, no bony tenderness, no laceration, no crepitus, no edema, no deformity, no swelling and no retraction.   Abdominal: Soft. Normal appearance and bowel sounds are normal. She exhibits no shifting  dullness, no distension, no pulsatile liver, no fluid wave, no abdominal bruit, no ascites, no pulsatile midline mass and no mass. There is no hepatosplenomegaly, splenomegaly or hepatomegaly. There is no tenderness. There is no rigidity, no rebound, no guarding, no CVA tenderness, no tenderness at McBurney's point and negative Juarez's sign.   Musculoskeletal: Normal range of motion. She exhibits no edema or tenderness.        Right foot: There is normal range of motion and no deformity.        Left foot: There is normal range of motion and no deformity.   Feet:   Right Foot:   Protective Sensation: 5 sites tested. 5 sites sensed.   Skin Integrity: Negative for ulcer, blister, skin breakdown, erythema, warmth, callus or dry skin.   Left Foot:   Protective Sensation: 5 sites tested. 5 sites sensed.   Skin Integrity: Negative for ulcer, blister, skin breakdown, erythema, warmth, callus or dry skin.   Lymphadenopathy:        Head (right side): No submental, no submandibular, no tonsillar, no preauricular, no posterior auricular and no occipital adenopathy present.        Head (left side): No submental, no submandibular, no tonsillar, no preauricular, no posterior auricular and no occipital adenopathy present.     She has no cervical adenopathy.        Right cervical: No superficial cervical, no deep cervical and no posterior cervical adenopathy present.       Left cervical: No superficial cervical, no deep cervical and no posterior cervical adenopathy present.     She has no axillary adenopathy.   Neurological: She is alert and oriented to person, place, and time. She has normal reflexes. She is not disoriented. She displays no atrophy, no tremor and normal reflexes. No cranial nerve deficit or sensory deficit. She exhibits normal muscle tone. She displays no seizure activity. Coordination and gait normal.   Reflex Scores:       Bicep reflexes are 2+ on the right side and 2+ on the left side.       Brachioradialis  reflexes are 2+ on the right side and 2+ on the left side.       Patellar reflexes are 2+ on the right side and 2+ on the left side.  Skin: Skin is warm and dry. No abrasion, no bruising, no burn, no ecchymosis, no laceration, no lesion, no petechiae, no purpura and no rash noted. Rash is not macular, not papular, not maculopapular, not nodular, not pustular, not vesicular and not urticarial. She is not diaphoretic. No cyanosis or erythema. No pallor. Nails show no clubbing.   Psychiatric: She has a normal mood and affect. Her behavior is normal. Judgment and thought content normal. Her mood appears not anxious. Her affect is not angry, not blunt and not labile. Her speech is not rapid and/or pressured, not delayed, not tangential and not slurred. She is not agitated, not aggressive, not hyperactive, not slowed, not withdrawn, not actively hallucinating and not combative. Thought content is not paranoid and not delusional. Cognition and memory are not impaired. She does not express impulsivity or inappropriate judgment. She does not exhibit a depressed mood. She expresses no homicidal and no suicidal ideation. She expresses no suicidal plans and no homicidal plans. She is communicative. She exhibits normal recent memory and normal remote memory. She is attentive.   Nursing note and vitals reviewed.    Assessment:     1. Type 1 diabetes mellitus with hypoglycemia and without coma    2. Mononeuritis multiplex, diabetic    3. Nuclear sclerosis of both eyes       Plan:   Rubi Jose is seen today for   1. Type 1 diabetes mellitus with hypoglycemia and without coma    2. Mononeuritis multiplex, diabetic    3. Nuclear sclerosis of both eyes      We have discussed the etiology and treatment options associated with the diagnosis as well as alternatives. She has elected the following treatments.     Type 1 diabetes mellitus with hypoglycemia and without coma  -     POCT glucose  -     Hemoglobin A1c; Future; Expected date:  09/13/2017  - Hyperphosphatemia will limit dietary phosphorus to 900 mg per day    Mononeuritis multiplex, diabetic   - Stable in control does not give patient any problems anymore.    Nuclear sclerosis of both eyes   - Stable has follow-up appointment with ophthalmology within 3 weeks.    1.) Patient was instructed to monitor blood glucose twice daily, fasting, and 2 hour post meal; if on Multiple Daily Injections (MDI) she will need to have pre-meal blood glucose as well. Reminded to bring BG meter or record to each visit for review.  2.) Reviewed pathophysiology of diabetes, complications related to the disease, importance of annual dilated eye exam and self daily foot examination.  3.) Continue medications as prescribed Levemir; Humalog. [facility] MyChart or Phone review in 1 week with BG records for adjustment of medication.  4.) Advised patient to continue to follow up with Dietician/CDE as directed.  5.) Discussed activity, benefits, methods, and precautions. Recommended patient start/continue some form of exercise and increase as tolerated to 60 minutes per day to facilitate weight loss and aid in control of BGs. Also reminded patient of WHO recommendation of 10,000 steps daily as a goal.   6.) A1C, TSH, Lipid Panel, CMP/renal panel with eGFR and Micro/Creatinine.  7.) Return to clinic in 3 months for follow up. Advised patient to call clinic with any questions or concerns.    A total of 30 minutes was spent in face to face time, of which 50 % was spent in counseling patient on disease process, complications, treatment, and side effects of medications.    The patient was explained the above plan and given opportunity to ask questions.  She understands, chooses and consents to this plan and accepts all the risks, which include but are not limited to the risks mentioned above.   She understands the alternative of having no testing, interventions or treatments at this time. She left content and without further  questions.

## 2017-09-14 ENCOUNTER — OFFICE VISIT (OUTPATIENT)
Dept: DIABETES | Facility: CLINIC | Age: 22
End: 2017-09-14
Payer: MEDICAID

## 2017-09-14 ENCOUNTER — CLINICAL SUPPORT (OUTPATIENT)
Dept: DIABETES | Facility: CLINIC | Age: 22
End: 2017-09-14
Payer: MEDICAID

## 2017-09-14 ENCOUNTER — LAB VISIT (OUTPATIENT)
Dept: LAB | Facility: HOSPITAL | Age: 22
End: 2017-09-14
Attending: PHYSICIAN ASSISTANT
Payer: MEDICAID

## 2017-09-14 VITALS
WEIGHT: 158.75 LBS | BODY MASS INDEX: 24.06 KG/M2 | HEIGHT: 68 IN | DIASTOLIC BLOOD PRESSURE: 80 MMHG | SYSTOLIC BLOOD PRESSURE: 108 MMHG

## 2017-09-14 DIAGNOSIS — E11.41 MONONEURITIS MULTIPLEX, DIABETIC: ICD-10-CM

## 2017-09-14 DIAGNOSIS — H25.13 NUCLEAR SCLEROSIS OF BOTH EYES: ICD-10-CM

## 2017-09-14 DIAGNOSIS — E10.649 TYPE 1 DIABETES MELLITUS WITH HYPOGLYCEMIA AND WITHOUT COMA: Primary | ICD-10-CM

## 2017-09-14 DIAGNOSIS — E10.649 TYPE 1 DIABETES MELLITUS WITH HYPOGLYCEMIA AND WITHOUT COMA: ICD-10-CM

## 2017-09-14 DIAGNOSIS — G58.7 MONONEURITIS MULTIPLEX, DIABETIC: ICD-10-CM

## 2017-09-14 LAB
ESTIMATED AVG GLUCOSE: 332 MG/DL
GLUCOSE SERPL-MCNC: 286 MG/DL (ref 70–110)
HBA1C MFR BLD HPLC: 13.2 %

## 2017-09-14 PROCEDURE — 3008F BODY MASS INDEX DOCD: CPT | Mod: ,,, | Performed by: PHYSICIAN ASSISTANT

## 2017-09-14 PROCEDURE — 82948 REAGENT STRIP/BLOOD GLUCOSE: CPT | Mod: PBBFAC,PO | Performed by: PHYSICIAN ASSISTANT

## 2017-09-14 PROCEDURE — 3046F HEMOGLOBIN A1C LEVEL >9.0%: CPT | Mod: ,,, | Performed by: PHYSICIAN ASSISTANT

## 2017-09-14 PROCEDURE — 99214 OFFICE O/P EST MOD 30 MIN: CPT | Mod: S$PBB,,, | Performed by: PHYSICIAN ASSISTANT

## 2017-09-14 PROCEDURE — 95250 CONT GLUC MNTR PHYS/QHP EQP: CPT | Mod: PBBFAC,PO

## 2017-09-14 PROCEDURE — 99213 OFFICE O/P EST LOW 20 MIN: CPT | Mod: PBBFAC,PO | Performed by: PHYSICIAN ASSISTANT

## 2017-09-14 PROCEDURE — 99999 PR PBB SHADOW E&M-EST. PATIENT-LVL III: CPT | Mod: PBBFAC,,, | Performed by: PHYSICIAN ASSISTANT

## 2017-09-14 PROCEDURE — 83036 HEMOGLOBIN GLYCOSYLATED A1C: CPT

## 2017-09-14 PROCEDURE — 36415 COLL VENOUS BLD VENIPUNCTURE: CPT | Mod: PO

## 2017-09-14 RX ORDER — AMITRIPTYLINE HYDROCHLORIDE 25 MG/1
25 TABLET, FILM COATED ORAL NIGHTLY
Qty: 30 TABLET | Refills: 11 | Status: SHIPPED | OUTPATIENT
Start: 2017-09-14 | End: 2018-10-02 | Stop reason: SDUPTHER

## 2017-09-14 RX ORDER — INSULIN GLARGINE 300 [IU]/ML
30 INJECTION, SOLUTION SUBCUTANEOUS NIGHTLY
COMMUNITY
End: 2017-09-24 | Stop reason: SDUPTHER

## 2017-09-14 NOTE — PROGRESS NOTES
"Patient is here in clinic today for placement of continuous glucose monitoring system. Patient was provided a Freestyle Greg sensor and a copy of the Continuous Glucose Monitoring Patient Log to to fill out during the study.     A detailed explanation of CGMS was provided. Patient informed that this is a blind procedure and they will not actually see the blood sugar tracing in real time. Reviewed with the patient the Freestyle Greg Pro Sensor education handout, "What you need to know"  to review self-care during the study to avoid sensor loosening or removal ie...bathing, swimming, dressing, and exercising.     Instructed patient to check blood sugar using home glucometer and to record the following on provided patient log sheets: blood sugar taken at home, meals and snacks, activity, and diabetes medications taken and dosage     Sensor was inserted on back of right upper arm  "

## 2017-09-15 ENCOUNTER — TELEPHONE (OUTPATIENT)
Dept: DIABETES | Facility: CLINIC | Age: 22
End: 2017-09-15

## 2017-09-15 NOTE — PROGRESS NOTES
Please inform Ms. Jose,  HgbA1c lab is unchanged  (13.2%)  however not at goal (<7.0%). You need to be more compliant with your medication and take it as directed. You have already had complications with your eyes and to prevent any further complications you need to get your blood sugar under control. Please increase your Toujeo by 5 units, and your Humalog by 2 units. Otherwise continue current treatment plan as previously prescribed. If you have trouble getting your medication [facility] may be able to help.

## 2017-09-15 NOTE — TELEPHONE ENCOUNTER
----- Message from Mansi Swain Jr., PA-C sent at 9/15/2017  7:55 AM CDT -----  Please inform Ms. Jose,  HgbA1c lab is unchanged  (13.2%)  however not at goal (<7.0%). You need to be more compliant with your medication and take it as directed. You have already had complications with your eyes and to prevent any further complic  ations you need to get your blood sugar under control. Please increase your Toujeo by 5 units, and your Humalog by 2 units. Otherwise continue current treatment plan as previously prescribed. If you have trouble getting your medication [facility] may be able to help.

## 2017-09-22 NOTE — TELEPHONE ENCOUNTER
----- Message from Henny Jay sent at 9/22/2017  1:42 PM CDT -----  -Pt needs a refill on her meds called into [facility] please call/ma -

## 2017-09-24 RX ORDER — LANCETS 33 GAUGE
150 EACH MISCELLANEOUS 3 TIMES DAILY
Qty: 150 EACH | Refills: 0 | Status: ON HOLD | OUTPATIENT
Start: 2017-09-24 | End: 2020-02-26 | Stop reason: SDUPTHER

## 2017-09-24 RX ORDER — INSULIN LISPRO 100 [IU]/ML
10 INJECTION, SOLUTION INTRAVENOUS; SUBCUTANEOUS
Qty: 9 ML | Refills: 11 | Status: SHIPPED | OUTPATIENT
Start: 2017-09-24 | End: 2018-03-19 | Stop reason: SDUPTHER

## 2017-09-24 RX ORDER — INSULIN GLARGINE 300 [IU]/ML
30 INJECTION, SOLUTION SUBCUTANEOUS NIGHTLY
Qty: 3 ML | Refills: 5 | Status: SHIPPED | OUTPATIENT
Start: 2017-09-24 | End: 2017-10-24

## 2017-09-29 ENCOUNTER — CLINICAL SUPPORT (OUTPATIENT)
Dept: DIABETES | Facility: CLINIC | Age: 22
End: 2017-09-29
Payer: MEDICAID

## 2018-03-19 RX ORDER — INSULIN LISPRO 100 [IU]/ML
INJECTION, SOLUTION INTRAVENOUS; SUBCUTANEOUS
Qty: 15 ML | Refills: 1 | Status: SHIPPED | OUTPATIENT
Start: 2018-03-19 | End: 2018-06-13 | Stop reason: SDUPTHER

## 2018-05-24 ENCOUNTER — TELEPHONE (OUTPATIENT)
Dept: DIABETES | Facility: CLINIC | Age: 23
End: 2018-05-24

## 2018-05-24 NOTE — TELEPHONE ENCOUNTER
----- Message from Maria Watkins sent at 5/23/2018  3:50 PM CDT -----  Contact: [facility]-([facility])-[phone]  Would like to consult with nurse regarding pre-authorization for Humalog-Kwik inject 100mg, patient states she is out, please call back at [phone]. Thanks/ar

## 2018-05-24 NOTE — TELEPHONE ENCOUNTER
Novolog pens are not covered. Patient will contact her insurance to find out what insulin is preferred.

## 2018-05-28 PROBLEM — E10.10 DIABETIC KETOACIDOSIS WITHOUT COMA ASSOCIATED WITH TYPE 1 DIABETES MELLITUS: Status: ACTIVE | Noted: 2018-05-28

## 2018-05-28 PROBLEM — R15.9 FECAL INCONTINENCE: Status: ACTIVE | Noted: 2018-05-28

## 2018-05-29 PROBLEM — E10.10 DIABETIC KETOACIDOSIS WITHOUT COMA ASSOCIATED WITH TYPE 1 DIABETES MELLITUS: Status: ACTIVE | Noted: 2018-05-29

## 2018-05-29 PROBLEM — E83.42 HYPOMAGNESEMIA: Status: ACTIVE | Noted: 2018-05-29

## 2018-06-13 ENCOUNTER — OFFICE VISIT (OUTPATIENT)
Dept: DIABETES | Facility: CLINIC | Age: 23
End: 2018-06-13
Payer: MEDICAID

## 2018-06-13 VITALS
DIASTOLIC BLOOD PRESSURE: 78 MMHG | SYSTOLIC BLOOD PRESSURE: 116 MMHG | HEIGHT: 68 IN | WEIGHT: 166.69 LBS | BODY MASS INDEX: 25.26 KG/M2

## 2018-06-13 DIAGNOSIS — E10.10 TYPE 1 DIABETES MELLITUS WITH KETOACIDOSIS WITHOUT COMA: Primary | ICD-10-CM

## 2018-06-13 LAB — GLUCOSE SERPL-MCNC: 379 MG/DL (ref 70–110)

## 2018-06-13 PROCEDURE — 99213 OFFICE O/P EST LOW 20 MIN: CPT | Mod: PBBFAC,PO | Performed by: PHYSICIAN ASSISTANT

## 2018-06-13 PROCEDURE — 82948 REAGENT STRIP/BLOOD GLUCOSE: CPT | Mod: PBBFAC,PO | Performed by: PHYSICIAN ASSISTANT

## 2018-06-13 PROCEDURE — 99214 OFFICE O/P EST MOD 30 MIN: CPT | Mod: S$PBB,,, | Performed by: PHYSICIAN ASSISTANT

## 2018-06-13 PROCEDURE — 99999 PR PBB SHADOW E&M-EST. PATIENT-LVL III: CPT | Mod: PBBFAC,,, | Performed by: PHYSICIAN ASSISTANT

## 2018-06-13 RX ORDER — INSULIN GLARGINE 300 [IU]/ML
40 INJECTION, SOLUTION SUBCUTANEOUS NIGHTLY
Qty: 4 ML | Refills: 11 | Status: SHIPPED | OUTPATIENT
Start: 2018-06-13 | End: 2018-06-13 | Stop reason: ALTCHOICE

## 2018-06-13 RX ORDER — INSULIN GLARGINE 100 [IU]/ML
40 INJECTION, SOLUTION SUBCUTANEOUS NIGHTLY
Qty: 12 ML | Refills: 11 | Status: SHIPPED | OUTPATIENT
Start: 2018-06-13 | End: 2018-08-20 | Stop reason: SDUPTHER

## 2018-06-13 RX ORDER — INSULIN LISPRO 100 [IU]/ML
INJECTION, SOLUTION INTRAVENOUS; SUBCUTANEOUS
Qty: 18 ML | Refills: 3 | Status: SHIPPED | OUTPATIENT
Start: 2018-06-13 | End: 2019-02-25 | Stop reason: SDUPTHER

## 2018-06-13 RX ORDER — INSULIN LISPRO 100 [IU]/ML
INJECTION, SOLUTION INTRAVENOUS; SUBCUTANEOUS
Qty: 15 ML | Refills: 1 | Status: SHIPPED | OUTPATIENT
Start: 2018-06-13 | End: 2018-06-13 | Stop reason: ALTCHOICE

## 2018-06-13 NOTE — PROGRESS NOTES
Subjective:      Patient ID: Rubi Jose is a 22 y.o. female.    PCP: Carla Stone NP      Rubi Jose is a pleasant 22 y.o. female presenting to follow up on diabetes mellitus. Also, pertinent to this visit in decision making is Hospitalization for DKA. She has had diabetes for 4 or more years. Her last visit in Diabetes Management was Visit date not found. Since that time she has had no improvement in her glycemia. Her blood sugar range fasting has been >250 and fed has been >300-400, and she has been monitoring 1-3 times per day. Her current concerns are glycemic control.    She denies any hospital admissions, emergency room visits, hypoglycemia, syncope, diaphoresis, chest pain, or dyspnea.    She has gained 8 pounds since last visit. Her BMI is 25.34 kg/m².    Her blood sugar in the clinic today was:   Lab Results   Component Value Date    POCGLU 379 (A) 06/13/2018     We discussed the American diabetes Association recommendations:  hemoglobin A1c below 7.0%; all diabetics should be on statins unless contraindicated; one aspirin daily unless contraindicated; fasting blood sugar between 80 and 130 mg/dL; postprandial blood sugar below 180 mg/dl; prevention of hypoglycemia, may adjust goals to higher levels if persistent; ACE or ARB therapy if not contraindicated; and maintain in an ideal body weight with BMI below 25.    Rubi is noncompliant much of the time with DM medications.     Rubi is noncompliant much of the time with lifestyle modifications to include activity and meal planning.       Current Outpatient Prescriptions:     amitriptyline (ELAVIL) 25 MG tablet, Take 1 tablet (25 mg total) by mouth every evening., Disp: 30 tablet, Rfl: 11    amoxicillin (AMOXIL) 500 MG Tab, Take 2 tablets PO twice a day for 14 days, Disp: , Rfl:     blood sugar diagnostic (ACCU-CHEK SMARTVIEW TEST STRIP) Strp, 150 strips by Misc.(Non-Drug; Combo Route) route 3 (three) times daily., Disp: 150 strip,  Rfl: 0    cholestyramine-aspartame (QUESTRAN LIGHT) 4 gram PwPk, Take 1 packet (4 g total) by mouth 4 (four) times daily., Disp: 120 packet, Rfl: 0    clarithromycin (BIAXIN) 500 MG tablet, Take 500 mg by mouth every 12 (twelve) hours., Disp: , Rfl:     gabapentin (NEURONTIN) 400 MG capsule, Take 1 capsule (400 mg total) by mouth 3 (three) times daily., Disp: 90 capsule, Rfl: 6    HUMALOG KWIKPEN INSULIN 100 unit/mL InPn pen, INJECT 10 UNITS INTO THE SKIN THREE TIMES DAILY WITH MEALS, Disp: 15 mL, Rfl: 1    insulin glargine, TOUJEO, (TOUJEO) 300 unit/mL (1.5 mL) InPn pen, Inject 37 Units into the skin every evening. , Disp: , Rfl:     lancets (ONETOUCH DELICA LANCETS) 33 gauge Misc, 150 lancets by Misc.(Non-Drug; Combo Route) route 3 (three) times daily., Disp: 150 each, Rfl: 0    metroNIDAZOLE (FLAGYL) 500 MG tablet, Take 500 mg by mouth 2 (two) times daily., Disp: , Rfl:     pantoprazole (PROTONIX) 40 MG tablet, Take 40 mg by mouth 2 (two) times daily., Disp: , Rfl:     STANDARDS OF CARE:  Eye doctor: Dr. Ennis, last exam 7/12/2017.  Dental exam: Recommend regular exams; denies gums bleeding.  Podiatry doctor: Dileep     ACTIVITY LEVEL: She exercises rarely.  MEAL PLANNING: Number of meals per day - 3. Number of snacks per day - 2.  Per dietary recall, patient is not limiting carbohydrates, saturated fats and sodium.   BLOOD GLUCOSE TESTING: Self-monitoring with   SOCIAL HISTORY: single. Lives with partner / significant other. unemployed no tobacco use    Health Maintenance   Topic Date Due    HPV Vaccines (1 of 3 - Female 3 Dose Series) 11/17/2004    Pneumococcal PPSV23 (Medium Risk) (1) 11/17/2011    Urine Microalbumin  07/25/2017    Eye Exam  07/13/2018    Lipid Panel  07/31/2018    Influenza Vaccine  08/01/2018    Foot Exam  09/14/2018    Hemoglobin A1c  11/29/2018    Pap Smear  05/23/2019    TETANUS VACCINE  10/27/2020       Diabetes Status:   Diabetes Management Status    Statin: Not  taking  ACE/ARB: Not taking    Screening or Prevention Patient's value Goal Complete/Controlled?   HgA1C Testing and Control   Lab Results   Component Value Date    HGBA1C 11.4 (H) 05/29/2018      Annually/Less than 8% No   Lipid profile : 07/31/2017 Annually Yes   LDL control Lab Results   Component Value Date    LDLCALC 29.6 (L) 07/31/2017    Annually/Less than 100 mg/dl  Yes   Nephropathy screening Lab Results   Component Value Date    LABMICR 18.0 07/25/2016     Lab Results   Component Value Date    PROTEINUA Negative 05/28/2018    Annually Yes   Blood pressure BP Readings from Last 1 Encounters:   06/13/18 116/78    Less than 140/90 Yes   Dilated retinal exam : 07/13/2017 Annually Yes   Foot exam   : 06/13/2018 Annually Yes     The following results were reviewed with patient.    Lab Results   Component Value Date    WBC 5.39 05/29/2018    HGB 11.4 (L) 05/29/2018    HCT 34.2 (L) 05/29/2018     05/29/2018    CHOL 116 (L) 07/31/2017    TRIG 182 (H) 07/31/2017    HDL 50 07/31/2017    LDLCALC 29.6 (L) 07/31/2017    ALT 46 (H) 05/28/2018    AST 38 (H) 05/28/2018     05/29/2018    K 3.6 05/29/2018     05/29/2018    ANIONGAP 12 05/29/2018    CREATININE 0.42 (L) 05/29/2018    ESTGFRAFRICA >60 05/29/2018    EGFRNONAA >60 05/29/2018    BUN 9 05/29/2018    CO2 24 05/29/2018    TSH 1.420 07/30/2017    GLU 96 05/29/2018       Lab Results   Component Value Date    HGBA1C 11.4 (H) 05/29/2018    HGBA1C 13.2 (H) 09/14/2017    HGBA1C 13.6 (H) 07/31/2017       Lab Results   Component Value Date    GLUTAMICACID 3.11 (H) 03/18/2016    CPEPTIDE 0.6 (L) 03/18/2016       Lab Results   Component Value Date    T3FREE 1.8 (L) 03/18/2016     Lab Results   Component Value Date    FREET4 1.11 03/18/2016     Lab Results   Component Value Date    TSH 1.420 07/30/2017     Lab Results   Component Value Date    THYROPEROXID <6.0 03/18/2016     Lab Results   Component Value Date    TFVUCBFL79 1065 (H) 09/13/2016     Lab Results    Component Value Date    CALCIUM 9.0 05/29/2018    PHOS 3.9 05/29/2018     Review of patient's allergies indicates:  No Known Allergies    Past Medical History:   Diagnosis Date    Cataract     Diabetes mellitus, type 2 Diagnosed in 2013    GERD (gastroesophageal reflux disease)        Review of Systems   Constitutional: Negative.  Negative for activity change, appetite change, chills, diaphoresis, fatigue, fever and unexpected weight change.   HENT: Negative.  Negative for congestion, dental problem, drooling, ear discharge, ear pain, facial swelling, hearing loss, mouth sores, nosebleeds, postnasal drip, rhinorrhea, sinus pressure, sneezing, sore throat, tinnitus, trouble swallowing and voice change.    Eyes: Negative.  Negative for photophobia, pain, discharge, redness, itching and visual disturbance.   Respiratory: Negative.  Negative for apnea, cough, choking, chest tightness, shortness of breath, wheezing and stridor.    Cardiovascular: Negative.  Negative for chest pain, palpitations and leg swelling.   Gastrointestinal: Negative.  Negative for abdominal distention, abdominal pain, anal bleeding, blood in stool, constipation, diarrhea, nausea, rectal pain and vomiting.   Endocrine: Negative.  Negative for cold intolerance, heat intolerance, polydipsia, polyphagia and polyuria.   Genitourinary: Negative.  Negative for decreased urine volume, difficulty urinating, dyspareunia, dysuria, enuresis, flank pain, frequency, genital sores, hematuria, menstrual problem, pelvic pain, urgency, vaginal bleeding, vaginal discharge and vaginal pain.   Musculoskeletal: Negative.  Negative for arthralgias, back pain, gait problem, joint swelling, myalgias, neck pain and neck stiffness.   Skin: Negative.  Negative for color change, pallor, rash and wound.   Allergic/Immunologic: Negative.  Negative for environmental allergies, food allergies and immunocompromised state.   Neurological: Negative.  Negative for dizziness,  "tremors, seizures, syncope, facial asymmetry, speech difficulty, weakness, light-headedness, numbness and headaches.   Hematological: Negative.  Negative for adenopathy. Does not bruise/bleed easily.   Psychiatric/Behavioral: Negative.  Negative for agitation, behavioral problems, confusion, decreased concentration, dysphoric mood, hallucinations, self-injury, sleep disturbance and suicidal ideas. The patient is not nervous/anxious and is not hyperactive.       Objective:     Vitals - 1 value per visit 5/29/2018 5/30/2018 6/13/2018   SYSTOLIC - 124 116   DIASTOLIC - 90 78   PULSE - 97 -   TEMPERATURE - 98.8 -   RESPIRATIONS - 14 -   SPO2 - 99 -   Weight (lb) 166.01 - 166.67   Weight (kg) 75.3 - 75.6   HEIGHT - - 5' 8"   BODY MASS INDEX - - 25.34   VISIT REPORT - - -   Pain Score  - - 0     Physical Exam   Constitutional: She is oriented to person, place, and time. She appears well-developed and well-nourished. She is cooperative.  Non-toxic appearance. She does not have a sickly appearance. She does not appear ill. No distress. She is not intubated.   HENT:   Head: Normocephalic and atraumatic. Not macrocephalic and not microcephalic. Head is without raccoon's eyes, without Cisse's sign, without abrasion, without contusion, without laceration, without right periorbital erythema and without left periorbital erythema. Hair is normal.   Right Ear: Hearing, tympanic membrane, external ear and ear canal normal. No lacerations. No drainage, swelling or tenderness. No foreign bodies. No mastoid tenderness. Tympanic membrane is not injected, not scarred, not perforated, not erythematous, not retracted and not bulging. Tympanic membrane mobility is normal. No middle ear effusion. No hemotympanum. No decreased hearing is noted.   Left Ear: Hearing, tympanic membrane, external ear and ear canal normal. No lacerations. No drainage, swelling or tenderness. No foreign bodies. No mastoid tenderness. Tympanic membrane is not " injected, not scarred, not perforated, not erythematous, not retracted and not bulging. Tympanic membrane mobility is normal.  No middle ear effusion. No hemotympanum. No decreased hearing is noted.   Nose: Nose normal. No mucosal edema, rhinorrhea, nose lacerations, sinus tenderness, nasal deformity, septal deviation or nasal septal hematoma. No epistaxis.  No foreign bodies. Right sinus exhibits no maxillary sinus tenderness and no frontal sinus tenderness. Left sinus exhibits no maxillary sinus tenderness and no frontal sinus tenderness.   Mouth/Throat: Oropharynx is clear and moist. No oropharyngeal exudate.   Eyes: Conjunctivae and EOM are normal. Pupils are equal, round, and reactive to light. Right eye exhibits no chemosis, no discharge and no exudate. No foreign body present in the right eye. Left eye exhibits no chemosis, no discharge, no exudate and no hordeolum. No foreign body present in the left eye. Right conjunctiva is not injected. Right conjunctiva has no hemorrhage. Left conjunctiva is not injected. Left conjunctiva has no hemorrhage. No scleral icterus. Right eye exhibits normal extraocular motion and no nystagmus. Left eye exhibits normal extraocular motion and no nystagmus. Right pupil is round and reactive. Left pupil is round and reactive. Pupils are equal.   Neck: Trachea normal, normal range of motion and full passive range of motion without pain. Neck supple. Normal carotid pulses, no hepatojugular reflux and no JVD present. No tracheal tenderness, no spinous process tenderness and no muscular tenderness present. Carotid bruit is not present. No neck rigidity. No tracheal deviation, no edema, no erythema and normal range of motion present. No thyroid mass and no thyromegaly present.   Cardiovascular: Normal rate, regular rhythm, normal heart sounds and intact distal pulses.   No extrasystoles are present. PMI is not displaced.  Exam reveals no gallop, no friction rub and no decreased  pulses.    No murmur heard.  Pulses:       Dorsalis pedis pulses are 2+ on the right side, and 2+ on the left side.        Posterior tibial pulses are 2+ on the right side, and 2+ on the left side.   Pulmonary/Chest: Effort normal and breath sounds normal. No accessory muscle usage or stridor. No apnea, no tachypnea and no bradypnea. She is not intubated. No respiratory distress. She has no decreased breath sounds. She has no wheezes. She has no rhonchi. She has no rales. Chest wall is not dull to percussion. She exhibits no mass, no tenderness, no bony tenderness, no laceration, no crepitus, no edema, no deformity, no swelling and no retraction.   Abdominal: Soft. Normal appearance and bowel sounds are normal. She exhibits no shifting dullness, no distension, no pulsatile liver, no fluid wave, no abdominal bruit, no ascites, no pulsatile midline mass and no mass. There is no hepatosplenomegaly, splenomegaly or hepatomegaly. There is no tenderness. There is no rigidity, no rebound, no guarding, no CVA tenderness, no tenderness at McBurney's point and negative Juarez's sign.   Musculoskeletal: Normal range of motion. She exhibits no edema or tenderness.        Right foot: There is normal range of motion and no deformity.        Left foot: There is normal range of motion and no deformity.   Feet:   Right Foot:   Protective Sensation: 5 sites tested. 5 sites sensed.   Skin Integrity: Negative for ulcer, blister, skin breakdown, erythema, warmth, callus or dry skin.   Left Foot:   Protective Sensation: 5 sites tested. 5 sites sensed.   Skin Integrity: Negative for ulcer, blister, skin breakdown, erythema, warmth, callus or dry skin.   Lymphadenopathy:        Head (right side): No submental, no submandibular, no tonsillar, no preauricular, no posterior auricular and no occipital adenopathy present.        Head (left side): No submental, no submandibular, no tonsillar, no preauricular, no posterior auricular and no  occipital adenopathy present.     She has no cervical adenopathy.        Right cervical: No superficial cervical, no deep cervical and no posterior cervical adenopathy present.       Left cervical: No superficial cervical, no deep cervical and no posterior cervical adenopathy present.     She has no axillary adenopathy.   Neurological: She is alert and oriented to person, place, and time. She has normal reflexes. She is not disoriented. She displays no atrophy, no tremor and normal reflexes. No cranial nerve deficit or sensory deficit. She exhibits normal muscle tone. She displays no seizure activity. Coordination and gait normal.   Reflex Scores:       Bicep reflexes are 2+ on the right side and 2+ on the left side.       Brachioradialis reflexes are 2+ on the right side and 2+ on the left side.       Patellar reflexes are 2+ on the right side and 2+ on the left side.  Skin: Skin is warm and dry. No abrasion, no bruising, no burn, no ecchymosis, no laceration, no lesion, no petechiae, no purpura and no rash noted. Rash is not macular, not papular, not maculopapular, not nodular, not pustular, not vesicular and not urticarial. She is not diaphoretic. No cyanosis or erythema. No pallor. Nails show no clubbing.   Psychiatric: She has a normal mood and affect. Her behavior is normal. Judgment and thought content normal. Her mood appears not anxious. Her affect is not angry, not blunt and not labile. Her speech is not rapid and/or pressured, not delayed, not tangential and not slurred. She is not agitated, not aggressive, not hyperactive, not slowed, not withdrawn, not actively hallucinating and not combative. Thought content is not paranoid and not delusional. Cognition and memory are not impaired. She does not express impulsivity or inappropriate judgment. She does not exhibit a depressed mood. She expresses no homicidal and no suicidal ideation. She expresses no suicidal plans and no homicidal plans. She is  communicative. She exhibits normal recent memory and normal remote memory. She is attentive.   Nursing note and vitals reviewed.    Assessment:     1. Type 1 diabetes mellitus with ketoacidosis without coma       Plan:     Rubi Jose is seen today for   1. Type 1 diabetes mellitus with ketoacidosis without coma    2. Uncontrolled type 1 diabetes mellitus with diabetic polyneuropathy      We have discussed the etiology and treatment options associated with the diagnosis as well as alternatives. She has elected the following treatments.     Type 1 diabetes mellitus with ketoacidosis without coma  -     POCT glucose  -     Discontinue: insulin glargine, TOUJEO, (TOUJEO) 300 unit/mL (1.5 mL) InPn pen; Inject 40 Units into the skin every evening.  Dispense: 4 mL; Refill: 11    Uncontrolled type 1 diabetes mellitus with diabetic polyneuropathy  -     POCT glucose  -     Discontinue: insulin lispro (HUMALOG KWIKPEN INSULIN) 100 unit/mL InPn pen; Titrate up to 20 units subcutaneously three times a day 10-15 min before meals as directed in after visit summary.  Dispense: 15 mL; Refill: 1  -     Discontinue: insulin glargine, TOUJEO, (TOUJEO) 300 unit/mL (1.5 mL) InPn pen; Inject 40 Units into the skin every evening.  Dispense: 4 mL; Refill: 11    1.) Patient was instructed to monitor blood glucose 6 times daily. Unless she is not on Multiple Daily Injections (MDI). Then she will check it twice daily, Fasting and occasionally 2 hours after meals. Reminded to bring BG meter or record to each visit for review.  2.) Reviewed pathophysiology of diabetes, complications related to the disease, importance of annual dilated eye exam and self daily foot examination.  3.) Continue medications as prescribed MDI with Toujeo and Humalog. [facility] MyChart or Phone review in 1 week with BG records for adjustment of medication.  4.) Advised patient to continue to follow up with Dietician/CDE as directed.  5.) Discussed activity, benefits,  "methods, and precautions. Recommended patient start/continue some form of exercise and increase as tolerated to 60 minutes per day to facilitate weight loss and aid in control of BGs. Also reminded patient of WHO recommendation of 10,000 steps daily as a goal.   6.) A1C, TSH, Lipid Panel, CMP/renal panel with eGFR and Micro/Creatinine prior to next visit, if not already done.  7.) Return to clinic in 6 weeks for follow up. Advised patient to call clinic with any questions or concerns.     I have reviewed your results and they are still quite high. I would like you to start "Treating to Target". The treatment will be Insulin and your target will be the Fasting and 2 hour post meal blood sugar. It will work in this manner;    1. Goal for Fasting blood sugar is  mg/dl. I realize that you will need time to adjust to the new levels and presently you may feel too low if you are too aggressive now. So go slow and aim to lower your blood sugar to below 200 then 150 then 100 over several months.    2. Goal for 2 hour post meal blood sugar is below 180 mg/dl, here the same rules apply as in #1.    3. You will check your fasting blood sugar daily, if not where we would like it to be over a 3 day period then that evening we will increase the Toujeo dose by 5 units. Then repeat the process over the next 3 days. Remember this is a slow process and take our time getting to goal. But, each week should be better than prior weeks. Blood sugars below 70 are unacceptable and should raise a "RED FLAG" where we may have to reduce our dose of insulin.    4. You will check your post meal glucose daily as well. However, each day you will check a different meal, (ie. Monday-breakfast; Tuesday- lunch; Wednesday- supper, then repeat). If your post meal glucose is not where we would like, increase pre-meal insulin by 2 units next time. A word of CAUTION: mealtime insulin is dependant on the size and concentration of your meal content. If " "not consuming a large meal do not take large dose of insulin. Use the reasonable person rule.     5. If you have any questions please do not hesitate to call.    Intensive insulin Therapy with correction factor:    You are on Intensive insulin therapy with Basal and Bolus insulin. Toujeo is your Basal insulin and will help maintain your fasting and between meal sugar. Your fast acting or rescue insulin is either Humalog, Novalog or Regular insulin and will control your post meal sugar.     You will Take 40 units of Toujeo at 9 pm each night. This will be adjusted up or down depending on your fasting blood sugar before breakfast.    Admelog (Insulin Lispro) will follow this pre meal schedule; Correction factor of "2 units per 50 mg/dl" and is based on 30-60 grams of carbohydrates per meal.    If blood sugar is below 70 eat first then check your blood sugar 2 hours later and make correction.  If blood pre-meal sugar is  70 -150 take 20 units of insulin Lispro;  If blood pre-meal sugar is 151-200 take +2 units of insulin Lispro;  If blood pre-meal sugar is 201-250 take +4 units of insulin Lispro;  If blood pre-meal sugar is 251-300 take +6 units of insulin Lispro;  If blood pre-meal sugar is 301-350 take +8 units of insulin Lispro;  If blood pre-meal sugar is 351-400+ take +10 units of insulin Lispro;  Also increase water intake and call for appointment.    A total of 30 minutes was spent in face to face time, of which 50 % was spent in counseling patient on disease process, complications, treatment, and side effects of medications.    The patient was explained the above plan and given opportunity to ask questions.  She understands, chooses and consents to this plan and accepts all the risks, which include but are not limited to the risks mentioned above.   She understands the alternative of having no testing, interventions or treatments at this time. She left content and without further questions.     Disclaimer:  " This note is prepared using voice recognition software and as such is likely to have errors and has not been proof read. Please contact me for questions.

## 2018-06-14 ENCOUNTER — TELEPHONE (OUTPATIENT)
Dept: DIABETES | Facility: CLINIC | Age: 23
End: 2018-06-14

## 2018-06-14 NOTE — TELEPHONE ENCOUNTER
----- Message from Anat Rangel sent at 6/14/2018  8:49 AM CDT -----  Contact: [facility] - Donn   Stated he's calling to verify information about a pt prescription, he can be reached at [phone]

## 2018-06-14 NOTE — TELEPHONE ENCOUNTER
Spoke with Donn at  [facility], informed him that patient is being switched to Basaglar instead of Toujeo. Basaglar was approved for $3.00/mo.

## 2018-06-24 NOTE — PATIENT INSTRUCTIONS
" I have reviewed your results and they are still quite high. I would like you to start "Treating to Target". The treatment will be Insulin and your target will be the Fasting and 2 hour post meal blood sugar. It will work in this manner;    1. Goal for Fasting blood sugar is  mg/dl. I realize that you will need time to adjust to the new levels and presently you may feel too low if you are too aggressive now. So go slow and aim to lower your blood sugar to below 200 then 150 then 100 over several months.    2. Goal for 2 hour post meal blood sugar is below 180 mg/dl, here the same rules apply as in #1.    3. You will check your fasting blood sugar daily, if not where we would like it to be over a 3 day period then that evening we will increase the Toujeo dose by 5 units. Then repeat the process over the next 3 days. Remember this is a slow process and take our time getting to goal. But, each week should be better than prior weeks. Blood sugars below 70 are unacceptable and should raise a "RED FLAG" where we may have to reduce our dose of insulin.    4. You will check your post meal glucose daily as well. However, each day you will check a different meal, (ie. Monday-breakfast; Tuesday- lunch; Wednesday- supper, then repeat). If your post meal glucose is not where we would like, increase pre-meal insulin by 2 units next time. A word of CAUTION: mealtime insulin is dependant on the size and concentration of your meal content. If not consuming a large meal do not take large dose of insulin. Use the reasonable person rule.     5. If you have any questions please do not hesitate to call.    Intensive insulin Therapy with correction factor:    You are on Intensive insulin therapy with Basal and Bolus insulin. Lantus, Levamir or NPH is your Basal insulin and will help maintain your fasting and between meal sugar. Your fast acting or rescue insulin is either Humalog, Novalog or Regular insulin and will control your " "post meal sugar.     You will Take 40 units of Lantus at 9 pm each night. This will be adjusted up or down depending on your fasting blood sugar before breakfast.    Humalog will follow this pre meal schedule; Correction factor of "2 units per 50 mg/dl" and is based on 30-60 grams of carbohydrates per meal.    If blood sugar is below 70 eat first then check your blood sugar 2 hours later and make correction.  If blood pre-meal sugar is  70 -150 take 20 units of insulin Lispro;  If blood pre-meal sugar is 151-200 take +2 units of insulin Lispro;  If blood pre-meal sugar is 201-250 take +4 units of insulin Lispro;  If blood pre-meal sugar is 251-300 take +6 units of insulin Lispro;  If blood pre-meal sugar is 301-350 take +8 units of insulin Lispro;  If blood pre-meal sugar is 351-400+ take +10 units of insulin Lispro;  Also increase water intake and call for appointment.    "

## 2018-08-09 ENCOUNTER — TELEPHONE (OUTPATIENT)
Dept: DIABETES | Facility: CLINIC | Age: 23
End: 2018-08-09

## 2018-08-09 NOTE — TELEPHONE ENCOUNTER
----- Message from Corey Sevilla sent at 8/9/2018  8:59 AM CDT -----  Contact: [phone]  Pt would like a new Rx sent to the pharmacy for Insulin, Please call back at [phone].  x-

## 2018-08-09 NOTE — TELEPHONE ENCOUNTER
Pt states she her insurance doesn't pay for Humalog but pays for Admeolog. Pt was advised that Admelog had already been sent to her pharmacy in June. Pt states that she would go  prescription today.

## 2018-08-20 RX ORDER — INSULIN GLARGINE 100 [IU]/ML
40 INJECTION, SOLUTION SUBCUTANEOUS NIGHTLY
Qty: 12 ML | Refills: 11 | Status: SHIPPED | OUTPATIENT
Start: 2018-08-20 | End: 2019-02-25 | Stop reason: SDUPTHER

## 2018-08-20 NOTE — TELEPHONE ENCOUNTER
Patient stated she was given only two insulin pens (Basaglar) and is currently on her second one and is almost out. Please revise.

## 2018-08-20 NOTE — TELEPHONE ENCOUNTER
----- Message from Inocencia Dunnite sent at 8/20/2018  9:35 AM CDT -----  Contact: Pt  Pt called and stated she needs an appt as soon as possible because she is out of medication. She can be reached at [phone] (home)     Thanks,  TF

## 2018-08-22 ENCOUNTER — TELEPHONE (OUTPATIENT)
Dept: DIABETES | Facility: CLINIC | Age: 23
End: 2018-08-22

## 2018-08-22 NOTE — TELEPHONE ENCOUNTER
Pt was advised that she has 2 remaining refill left on insulin. Pt states that she will call pharmacy to get more refills.

## 2018-08-22 NOTE — TELEPHONE ENCOUNTER
----- Message from Vignesh Blanco sent at 8/22/2018 10:29 AM CDT -----  Contact: pt  States she's calling to be worked in today for a med chk/ states she's out of medicine and also wants to discuss the wrong medicine being called in to the pharm and can be reached at [phone]

## 2018-10-02 DIAGNOSIS — E10.649 TYPE 1 DIABETES MELLITUS WITH HYPOGLYCEMIA AND WITHOUT COMA: ICD-10-CM

## 2018-10-02 RX ORDER — AMITRIPTYLINE HYDROCHLORIDE 25 MG/1
TABLET, FILM COATED ORAL
Qty: 30 TABLET | Refills: 11 | Status: SHIPPED | OUTPATIENT
Start: 2018-10-02 | End: 2019-02-25

## 2018-11-19 ENCOUNTER — OFFICE VISIT (OUTPATIENT)
Dept: DIABETES | Facility: CLINIC | Age: 23
End: 2018-11-19
Payer: MEDICAID

## 2018-11-19 VITALS
WEIGHT: 179.44 LBS | BODY MASS INDEX: 27.19 KG/M2 | HEIGHT: 68 IN | DIASTOLIC BLOOD PRESSURE: 84 MMHG | SYSTOLIC BLOOD PRESSURE: 124 MMHG

## 2018-11-19 DIAGNOSIS — E10.10 DIABETIC KETOACIDOSIS WITHOUT COMA ASSOCIATED WITH TYPE 1 DIABETES MELLITUS: Primary | ICD-10-CM

## 2018-11-19 LAB — GLUCOSE SERPL-MCNC: 345 MG/DL (ref 70–110)

## 2018-11-19 PROCEDURE — 99214 OFFICE O/P EST MOD 30 MIN: CPT | Mod: S$PBB,,, | Performed by: PHYSICIAN ASSISTANT

## 2018-11-19 PROCEDURE — 99999 PR PBB SHADOW E&M-EST. PATIENT-LVL III: CPT | Mod: PBBFAC,,, | Performed by: PHYSICIAN ASSISTANT

## 2018-11-19 PROCEDURE — 99213 OFFICE O/P EST LOW 20 MIN: CPT | Mod: PBBFAC,PO | Performed by: PHYSICIAN ASSISTANT

## 2018-11-19 PROCEDURE — 82962 GLUCOSE BLOOD TEST: CPT | Mod: PBBFAC,PO | Performed by: PHYSICIAN ASSISTANT

## 2018-11-19 NOTE — PROGRESS NOTES
Subjective:      Patient ID: Rubi Jose is a 23 y.o. female.    PCP: Carla Stone NP      Rubi Jose is a pleasant 23 y.o. female presenting to follow up on diabetes mellitus. Also, pertinent to this visit in decision making is hypertension, hyperlipidemia, and adherence. She has had diabetes for 5 or more years. Her last visit in Diabetes Management was 6/13/2018. Since that time Rubi has continued to struggle with her glycemic control.  She was recently admitted a gain for diabetic ketoacidosis.  We have discussed this on multiple previous occasions I have informed her that she must take her insulin and remain well hydrated.  She informs me that she was unable to get her insulin. However, this is her 2nd documented admission this year to the ICU for diabetic ketoacidosis. Her blood sugar range fasting has been 160-170 and fed has been 200+, and she has been monitoring 2-4 times per day. Her current concerns are glycemic control.    She reports hospital admissions for diabetic ketoacidosis, emergency room visits, she denies hypoglycemia, syncope, diaphoresis, chest pain, or dyspnea.    She has gained 13 pounds since last visit. Her BMI is 27.29 kg/m².    Her blood sugar in the clinic today was:   Lab Results   Component Value Date    POCGLU 345 (A) 11/19/2018       We discussed the American diabetes Association recommendations:  hemoglobin A1c below 7.0%; all diabetics should be on statins unless contraindicated; one aspirin daily unless contraindicated; fasting blood sugar between 80 and 130 mg/dL; postprandial blood sugar below 180 mg/dl; prevention of hypoglycemia, may adjust goals to higher levels if persistent; ACE or ARB therapy if not contraindicated; and maintain in an ideal body weight with BMI below 25.    Rubi is noncompliant much of the time with DM medications.     Rubi is noncompliant much of the time with lifestyle modifications to include activity and meal planning.        Current Outpatient Medications:     amitriptyline (ELAVIL) 25 MG tablet, TAKE ONE TABLET BY MOUTH IN THE EVENING, Disp: 30 tablet, Rfl: 11    blood sugar diagnostic (ACCU-CHEK SMARTVIEW TEST STRIP) Strp, 150 strips by Misc.(Non-Drug; Combo Route) route 3 (three) times daily., Disp: 150 strip, Rfl: 0    insulin glargine (BASAGLAR KWIKPEN U-100 INSULIN) 100 unit/mL (3 mL) InPn pen, Inject 40 Units into the skin every evening., Disp: 12 mL, Rfl: 11    insulin lispro (ADMELOG SOLOSTAR U-100 INSULIN) 100 unit/mL InPn pen, Titrate up to 20 units subcutaneously three times a day 10-15 min before meals as directed in after visit summary, Disp: 18 mL, Rfl: 3    lancets (ONETOUCH DELICA LANCETS) 33 gauge Misc, 150 lancets by Misc.(Non-Drug; Combo Route) route 3 (three) times daily., Disp: 150 each, Rfl: 0    gabapentin (NEURONTIN) 400 MG capsule, Take 1 capsule (400 mg total) by mouth 3 (three) times daily., Disp: 90 capsule, Rfl: 6    ranitidine (ZANTAC) 75 MG tablet, Take 75 mg by mouth as needed., Disp: , Rfl:   No current facility-administered medications for this visit.     STANDARDS OF CARE:  Eye doctor: Dr. John, last exam 7/13/17.  Dental exNoam: Recommend regular exams; denies gums bleeding.  Podiatry doctor:  ACE/ARB: No  Statin: No    ACTIVITY LEVEL: She exercises rarely.  BLOOD GLUCOSE TESTING: Self-monitoring with   SOCIAL HISTORY: single. Lives with partner / significant other. unknown occupation no tobacco use    Health Maintenance   Topic Date Due    HPV Vaccines (1 - Female 3-dose series) 11/17/2004    Pneumococcal PPSV23 (Medium Risk) (1) 11/17/2011    Urine Microalbumin  07/25/2017    Eye Exam  07/13/2018    Lipid Panel  07/31/2018    Influenza Vaccine  08/01/2018    Hemoglobin A1c  11/29/2018    Pap Smear  05/23/2019    Foot Exam  06/13/2019    TETANUS VACCINE  10/27/2020       Diabetes Status:   Diabetes Management Status    Statin: Not taking  ACE/ARB: Not taking    Screening or  Prevention Patient's value Goal Complete/Controlled?   HgA1C Testing and Control   Lab Results   Component Value Date    HGBA1C 11.4 (H) 05/29/2018      Annually/Less than 8% No   Lipid profile : 07/31/2017 Annually No   LDL control Lab Results   Component Value Date    LDLCALC 29.6 (L) 07/31/2017    Annually/Less than 100 mg/dl  No   Nephropathy screening Lab Results   Component Value Date    LABMICR 18.0 07/25/2016     Lab Results   Component Value Date    PROTEINUA Negative 05/28/2018    Annually Yes   Blood pressure BP Readings from Last 1 Encounters:   11/19/18 124/84    Less than 140/90 Yes   Dilated retinal exam : 07/13/2017 Annually No   Foot exam   : 06/13/2018 Annually Yes     The following results were reviewed with patient.    Lab Results   Component Value Date    WBC 5.39 05/29/2018    HGB 11.4 (L) 05/29/2018    HCT 34.2 (L) 05/29/2018     05/29/2018    CHOL 116 (L) 07/31/2017    TRIG 182 (H) 07/31/2017    HDL 50 07/31/2017    LDLCALC 29.6 (L) 07/31/2017    ALT 46 (H) 05/28/2018    AST 38 (H) 05/28/2018     05/29/2018    K 3.6 05/29/2018     05/29/2018    ANIONGAP 12 05/29/2018    CREATININE 0.42 (L) 05/29/2018    ESTGFRAFRICA >60 05/29/2018    EGFRNONAA >60 05/29/2018    BUN 9 05/29/2018    CO2 24 05/29/2018    TSH 1.420 07/30/2017    GLU 96 05/29/2018       Lab Results   Component Value Date    HGBA1C 11.4 (H) 05/29/2018    HGBA1C 13.2 (H) 09/14/2017    HGBA1C 13.6 (H) 07/31/2017       Lab Results   Component Value Date    GLUTAMICACID 3.11 (H) 03/18/2016    CPEPTIDE 0.6 (L) 03/18/2016    T3FREE 1.8 (L) 03/18/2016    FREET4 1.11 03/18/2016    TSH 1.420 07/30/2017    THYROPEROXID <6.0 03/18/2016    WRZDDUEW74 1065 (H) 09/13/2016    CALCIUM 9.0 05/29/2018    PHOS 3.9 05/29/2018       Review of patient's allergies indicates:  No Known Allergies    Past Medical History:   Diagnosis Date    Cataract     Diabetes mellitus, type 2 Diagnosed in 2013    GERD (gastroesophageal reflux  disease)        Review of Systems   Constitutional: Negative.  Negative for activity change, appetite change, chills, diaphoresis, fatigue, fever and unexpected weight change.   HENT: Negative.  Negative for congestion, dental problem, drooling, ear discharge, ear pain, facial swelling, hearing loss, mouth sores, nosebleeds, postnasal drip, rhinorrhea, sinus pressure, sneezing, sore throat, tinnitus, trouble swallowing and voice change.    Eyes: Negative.  Negative for photophobia, pain, discharge, redness, itching and visual disturbance.   Respiratory: Negative.  Negative for apnea, cough, choking, chest tightness, shortness of breath, wheezing and stridor.    Cardiovascular: Negative.  Negative for chest pain, palpitations and leg swelling.   Gastrointestinal: Negative.  Negative for abdominal distention, abdominal pain, anal bleeding, blood in stool, constipation, diarrhea, nausea, rectal pain and vomiting.   Endocrine: Negative.  Negative for cold intolerance, heat intolerance, polydipsia, polyphagia and polyuria.   Genitourinary: Negative.  Negative for decreased urine volume, difficulty urinating, dyspareunia, dysuria, enuresis, flank pain, frequency, genital sores, hematuria, menstrual problem, pelvic pain, urgency, vaginal bleeding, vaginal discharge and vaginal pain.   Musculoskeletal: Negative.  Negative for arthralgias, back pain, gait problem, joint swelling, myalgias, neck pain and neck stiffness.   Skin: Negative.  Negative for color change, pallor, rash and wound.   Allergic/Immunologic: Negative.  Negative for environmental allergies, food allergies and immunocompromised state.   Neurological: Negative.  Negative for dizziness, tremors, seizures, syncope, facial asymmetry, speech difficulty, weakness, light-headedness, numbness and headaches.   Hematological: Negative.  Negative for adenopathy. Does not bruise/bleed easily.   Psychiatric/Behavioral: Negative.  Negative for agitation, behavioral  "problems, confusion, decreased concentration, dysphoric mood, hallucinations, self-injury, sleep disturbance and suicidal ideas. The patient is not nervous/anxious and is not hyperactive.       Objective:     Vitals - 1 value per visit 5/30/2018 6/13/2018 11/19/2018   SYSTOLIC 124 116 124   DIASTOLIC 90 78 84   PULSE 97 - -   TEMPERATURE 98.8 - -   RESPIRATIONS 14 - -   SPO2 99 - -   Weight (lb) - 166.67 179.45   Weight (kg) - 75.6 81.4   HEIGHT - 5' 8" 5' 8"   BODY MASS INDEX - 25.34 27.29   VISIT REPORT - - -   Pain Score  - 0 0       Physical Exam   Constitutional: She is oriented to person, place, and time. She appears well-developed and well-nourished. She is cooperative.  Non-toxic appearance. She does not have a sickly appearance. She does not appear ill. No distress. She is not intubated.   HENT:   Head: Normocephalic and atraumatic. Not macrocephalic and not microcephalic. Head is without raccoon's eyes, without Cisse's sign, without abrasion, without contusion, without laceration, without right periorbital erythema and without left periorbital erythema. Hair is normal.   Right Ear: Hearing, tympanic membrane, external ear and ear canal normal. No lacerations. No drainage, swelling or tenderness. No foreign bodies. No mastoid tenderness. Tympanic membrane is not injected, not scarred, not perforated, not erythematous, not retracted and not bulging. Tympanic membrane mobility is normal. No middle ear effusion. No hemotympanum. No decreased hearing is noted.   Left Ear: Hearing, tympanic membrane, external ear and ear canal normal. No lacerations. No drainage, swelling or tenderness. No foreign bodies. No mastoid tenderness. Tympanic membrane is not injected, not scarred, not perforated, not erythematous, not retracted and not bulging. Tympanic membrane mobility is normal.  No middle ear effusion. No hemotympanum. No decreased hearing is noted.   Nose: Nose normal. No mucosal edema, rhinorrhea, nose " lacerations, sinus tenderness, nasal deformity, septal deviation or nasal septal hematoma. No epistaxis.  No foreign bodies. Right sinus exhibits no maxillary sinus tenderness and no frontal sinus tenderness. Left sinus exhibits no maxillary sinus tenderness and no frontal sinus tenderness.   Mouth/Throat: Oropharynx is clear and moist. No oropharyngeal exudate.   Eyes: Conjunctivae and EOM are normal. Pupils are equal, round, and reactive to light. Right eye exhibits no chemosis, no discharge and no exudate. No foreign body present in the right eye. Left eye exhibits no chemosis, no discharge, no exudate and no hordeolum. No foreign body present in the left eye. Right conjunctiva is not injected. Right conjunctiva has no hemorrhage. Left conjunctiva is not injected. Left conjunctiva has no hemorrhage. No scleral icterus. Right eye exhibits normal extraocular motion and no nystagmus. Left eye exhibits normal extraocular motion and no nystagmus. Right pupil is round and reactive. Left pupil is round and reactive. Pupils are equal.   Neck: Trachea normal, normal range of motion and full passive range of motion without pain. Neck supple. Normal carotid pulses, no hepatojugular reflux and no JVD present. No tracheal tenderness, no spinous process tenderness and no muscular tenderness present. Carotid bruit is not present. No neck rigidity. No tracheal deviation, no edema, no erythema and normal range of motion present. No thyroid mass and no thyromegaly present.   Cardiovascular: Normal rate, regular rhythm, normal heart sounds and intact distal pulses.  No extrasystoles are present. PMI is not displaced. Exam reveals no gallop, no friction rub and no decreased pulses.   No murmur heard.  Pulses:       Dorsalis pedis pulses are 2+ on the right side, and 2+ on the left side.        Posterior tibial pulses are 2+ on the right side, and 2+ on the left side.   Pulmonary/Chest: Effort normal and breath sounds normal. No  accessory muscle usage or stridor. No apnea, no tachypnea and no bradypnea. She is not intubated. No respiratory distress. She has no decreased breath sounds. She has no wheezes. She has no rhonchi. She has no rales. Chest wall is not dull to percussion. She exhibits no mass, no tenderness, no bony tenderness, no laceration, no crepitus, no edema, no deformity, no swelling and no retraction.   Abdominal: Soft. Normal appearance and bowel sounds are normal. She exhibits no shifting dullness, no distension, no pulsatile liver, no fluid wave, no abdominal bruit, no ascites, no pulsatile midline mass and no mass. There is no hepatosplenomegaly, splenomegaly or hepatomegaly. There is no tenderness. There is no rigidity, no rebound, no guarding, no CVA tenderness, no tenderness at McBurney's point and negative Juarez's sign.   Musculoskeletal: Normal range of motion. She exhibits no edema or tenderness.        Right foot: There is normal range of motion and no deformity.        Left foot: There is normal range of motion and no deformity.   Feet:   Right Foot:   Protective Sensation: 5 sites tested. 5 sites sensed.   Skin Integrity: Negative for ulcer, blister, skin breakdown, erythema, warmth, callus or dry skin.   Left Foot:   Protective Sensation: 5 sites tested. 5 sites sensed.   Skin Integrity: Negative for ulcer, blister, skin breakdown, erythema, warmth, callus or dry skin.   Lymphadenopathy:        Head (right side): No submental, no submandibular, no tonsillar, no preauricular, no posterior auricular and no occipital adenopathy present.        Head (left side): No submental, no submandibular, no tonsillar, no preauricular, no posterior auricular and no occipital adenopathy present.     She has no cervical adenopathy.        Right cervical: No superficial cervical, no deep cervical and no posterior cervical adenopathy present.       Left cervical: No superficial cervical, no deep cervical and no posterior cervical  adenopathy present.     She has no axillary adenopathy.   Neurological: She is alert and oriented to person, place, and time. She has normal reflexes. She is not disoriented. She displays no atrophy, no tremor and normal reflexes. No cranial nerve deficit or sensory deficit. She exhibits normal muscle tone. She displays no seizure activity. Coordination and gait normal.   Reflex Scores:       Bicep reflexes are 2+ on the right side and 2+ on the left side.       Brachioradialis reflexes are 2+ on the right side and 2+ on the left side.       Patellar reflexes are 2+ on the right side and 2+ on the left side.  Skin: Skin is warm and dry. No abrasion, no bruising, no burn, no ecchymosis, no laceration, no lesion, no petechiae, no purpura and no rash noted. Rash is not macular, not papular, not maculopapular, not nodular, not pustular, not vesicular and not urticarial. She is not diaphoretic. No cyanosis or erythema. No pallor. Nails show no clubbing.   Psychiatric: She has a normal mood and affect. Her behavior is normal. Judgment and thought content normal. Her mood appears not anxious. Her affect is not angry, not blunt and not labile. Her speech is not rapid and/or pressured, not delayed, not tangential and not slurred. She is not agitated, not aggressive, not hyperactive, not slowed, not withdrawn, not actively hallucinating and not combative. Thought content is not paranoid and not delusional. Cognition and memory are not impaired. She does not express impulsivity or inappropriate judgment. She does not exhibit a depressed mood. She expresses no homicidal and no suicidal ideation. She expresses no suicidal plans and no homicidal plans. She is communicative. She exhibits normal recent memory and normal remote memory. She is attentive.   Nursing note and vitals reviewed.    Assessment:     1. Diabetic ketoacidosis without coma associated with type 1 diabetes mellitus       Plan:   Rubi Jose is seen today for  "  1. Diabetic ketoacidosis without coma associated with type 1 diabetes mellitus      We have discussed the etiology and treatment options associated with the diagnosis as well as alternatives.       She has elected the following treatments.     Diabetic ketoacidosis without coma associated with type 1 diabetes mellitus  -     POCT Glucose, Hand-Held Device  - Again, I have discussed with her her her imperative need to take her insulin on a scheduled basis  - I have intensify her insulin as noted below in the treatment to target.  - a gain she was advised to monitor her blood sugar a minimum of 4 times daily  - she is to notify me of any changes in her diabetic status either hyper or hypo said it may be addressed before it becomes emergent.  - will follow up in the 12 weeks    1.) Patient was instructed to continue to monitor blood glucose 4 times daily. Reminded to bring BG meter or record to each visit for review.  2.) Reviewed pathophysiology of diabetes, complications related to the disease, importance of annual dilated eye exam and self daily foot examination.  3.) Continue medications as prescribed [facility] Basaglar and Admelog. [facility] MyChart or Phone review in 1 week with BG records for adjustment of medication.  4.) Advised patient to continue to follow up with Dietician/CDE as directed.  5.) Discussed activity, benefits, methods, and precautions. Recommended patient start/continue some form of exercise and increase as tolerated to 60 minutes per day to facilitate weight loss and aid in control of BGs. Also reminded patient of WHO recommendation of 10,000 steps daily as a goal.   6.) A1C, TSH, Lipid Panel, CMP/renal panel with eGFR and Micro/Creatinine prior to next visit, if not already done.  7.) Return to clinic in 12 weeks for follow up. Advised patient to call clinic with any questions or concerns.    I have reviewed your results and they are still quite high. I would like you to adjust "Treating to " "Target". The treatment will be Insulin and your target will be the Fasting and 2 hour post meal blood sugar. It will work in this manner;    1. Goal for Fasting blood sugar is  mg/dl. I realize that you will need time to adjust to the new levels and presently you may feel too low if you are too aggressive now. So go slow and aim to lower your blood sugar to below 200 then 150 then 100 over several months.    2. Goal for 2 hour post meal blood sugar is below 180 mg/dl, here the same rules apply as in #1.    3. You will check your fasting blood sugar daily, if not where we would like it to be over a 3 day period then that evening we will increase the Basaglar dose by 5 units. Then repeat the process over the next 3 days. Remember this is a slow process and take our time getting to goal. But, each week should be better than prior weeks. Blood sugars below 70 are unacceptable and should raise a "RED FLAG" where we may have to reduce our dose of insulin.    4. You will check your post meal glucose daily as well. However, each day you will check a different meal, (ie. Monday-breakfast; Tuesday- lunch; Wednesday- supper, then repeat). If your post meal glucose is not where we would like, increase pre-meal insulin by 2 units next time. A word of CAUTION: mealtime insulin is dependant on the size and concentration of your meal content. If not consuming a large meal do not take large dose of insulin. Use the reasonable person rule.     5. If you have any questions please do not hesitate to call.    Intensive insulin Therapy with correction factor:    You are on Intensive insulin therapy with Basal and Bolus insulin. Lantus, Levamir or NPH is your Basal insulin and will help maintain your fasting and between meal sugar. Your fast acting or rescue insulin is either Humalog, Novalog or Regular insulin and will control your post meal sugar.     You will Take 40 units of Basaglar at 9 pm each night. This will be adjusted up " "or down depending on your fasting blood sugar before breakfast.    Humalog will follow this pre meal schedule; Correction factor of "2 units per 50 mg/dl" and is based on 30-60 grams of carbohydrates per meal.    If blood sugar is below 70 eat first then check your blood sugar 2 hours later and make correction.  If blood pre-meal sugar is  70 -150 take 14 units of Admelog;  If blood pre-meal sugar is 151-200 take +2 units of Admelog;  If blood pre-meal sugar is 201-250 take +4 units of Admelog;  If blood pre-meal sugar is 251-300 take +6 units of Admelog;  If blood pre-meal sugar is 301-350 take +8 units of Admelog;  If blood pre-meal sugar is 351-400+ take +10 units of Admelog;  Also increase water intake and call for appointment.    A total of 30 minutes was spent in face to face time, of which 50 % was spent in counseling patient on disease process, complications, treatment, and side effects of medications.    The patient was explained the above plan and given opportunity to ask questions.  She understands, chooses and consents to this plan and accepts all the risks, which include but are not limited to the risks mentioned above.   She understands the alternative of having no testing, interventions or treatments at this time. She left content and without further questions.     Disclaimer:  This note is prepared using voice recognition software and as such is likely to have errors and has not been proof read. Please contact me for questions.   "

## 2018-11-19 NOTE — PATIENT INSTRUCTIONS
" I have reviewed your results and they are still quite high. I would like you to start "Treating to Target". The treatment will be Insulin and your target will be the Fasting and 2 hour post meal blood sugar. It will work in this manner;    1. Goal for Fasting blood sugar is  mg/dl. I realize that you will need time to adjust to the new levels and presently you may feel too low if you are too aggressive now. So go slow and aim to lower your blood sugar to below 200 then 150 then 100 over several months.    2. Goal for 2 hour post meal blood sugar is below 180 mg/dl, here the same rules apply as in #1.    3. You will check your fasting blood sugar daily, if not where we would like it to be over a 3 day period then that evening we will increase the Basaglar dose by 5 units. Then repeat the process over the next 3 days. Remember this is a slow process and take our time getting to goal. But, each week should be better than prior weeks. Blood sugars below 70 are unacceptable and should raise a "RED FLAG" where we may have to reduce our dose of insulin.    4. You will check your post meal glucose daily as well. However, each day you will check a different meal, (ie. Monday-breakfast; Tuesday- lunch; Wednesday- supper, then repeat). If your post meal glucose is not where we would like, increase pre-meal insulin by 2 units next time. A word of CAUTION: mealtime insulin is dependant on the size and concentration of your meal content. If not consuming a large meal do not take large dose of insulin. Use the reasonable person rule.     5. If you have any questions please do not hesitate to call.    Intensive insulin Therapy with correction factor:    You are on Intensive insulin therapy with Basal and Bolus insulin. Lantus, Levamir or NPH is your Basal insulin and will help maintain your fasting and between meal sugar. Your fast acting or rescue insulin is either Humalog, Novalog or Regular insulin and will control your " "post meal sugar.     You will Take 40 units of Basaglar at 9 pm each night. This will be adjusted up or down depending on your fasting blood sugar before breakfast.    Humalog will follow this pre meal schedule; Correction factor of "2 units per 50 mg/dl" and is based on 30-60 grams of carbohydrates per meal.    If blood sugar is below 70 eat first then check your blood sugar 2 hours later and make correction.  If blood pre-meal sugar is  70 -150 take 14 units of Admelog;  If blood pre-meal sugar is 151-200 take +2 units of Admelog;  If blood pre-meal sugar is 201-250 take +4 units of Admelog;  If blood pre-meal sugar is 251-300 take +6 units of Admelog;  If blood pre-meal sugar is 301-350 take +8 units of Admelog;  If blood pre-meal sugar is 351-400+ take +10 units of Admelog;  Also increase water intake and call for appointment.    "

## 2019-02-18 ENCOUNTER — OFFICE VISIT (OUTPATIENT)
Dept: OPHTHALMOLOGY | Facility: CLINIC | Age: 24
End: 2019-02-18
Payer: MEDICAID

## 2019-02-18 DIAGNOSIS — H52.223 REGULAR ASTIGMATISM OF BOTH EYES: ICD-10-CM

## 2019-02-18 DIAGNOSIS — E10.3291 MILD NONPROLIFERATIVE DIABETIC RETINOPATHY OF RIGHT EYE WITHOUT MACULAR EDEMA ASSOCIATED WITH TYPE 1 DIABETES MELLITUS: Primary | ICD-10-CM

## 2019-02-18 DIAGNOSIS — Z96.1 PSEUDOPHAKIA OF BOTH EYES: ICD-10-CM

## 2019-02-18 PROCEDURE — 92014 PR EYE EXAM, EST PATIENT,COMPREHESV: ICD-10-PCS | Mod: S$PBB,,, | Performed by: OPTOMETRIST

## 2019-02-18 PROCEDURE — 99211 OFF/OP EST MAY X REQ PHY/QHP: CPT | Mod: PBBFAC,PN | Performed by: OPTOMETRIST

## 2019-02-18 PROCEDURE — 99999 PR PBB SHADOW E&M-EST. PATIENT-LVL I: CPT | Mod: PBBFAC,,, | Performed by: OPTOMETRIST

## 2019-02-18 PROCEDURE — 92014 COMPRE OPH EXAM EST PT 1/>: CPT | Mod: S$PBB,,, | Performed by: OPTOMETRIST

## 2019-02-18 PROCEDURE — 99999 PR PBB SHADOW E&M-EST. PATIENT-LVL I: ICD-10-PCS | Mod: PBBFAC,,, | Performed by: OPTOMETRIST

## 2019-02-18 PROCEDURE — 92015 DETERMINE REFRACTIVE STATE: CPT | Mod: ,,, | Performed by: OPTOMETRIST

## 2019-02-18 PROCEDURE — 92015 PR REFRACTION: ICD-10-PCS | Mod: ,,, | Performed by: OPTOMETRIST

## 2019-02-18 NOTE — PROGRESS NOTES
HPI     PTs last exam was 7/13/17 with [facility]. PT c/o blurred near [place] and wears otc   readers.   PCIOL OU  Diabetic eye exam  Diagnosed with diabetes in 2016  Recent vision fluctuations no  Blood sugar: 120s  HPI    Any vision changes since last exam: no  Eye pain: no  Other ocular symptoms: no    Do you wear currently wear glasses or contacts? otc readers    Interested in contacts today? no    Do you plan on getting new glasses today? If needed          Last edited by Gladis Burton MA on 2/18/2019  1:56 PM. (History)            Assessment /Plan     For exam results, see Encounter Report.    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 Continue close care with PCP regarding diabetes.    Pseudophakia of both eyes  Doing well OU  Monitor 12 months    Regular astigmatism of both eyes  Eyeglass Final Rx     Eyeglass Final Rx       Sphere Cylinder Axis Add    Right -0.25 +0.50 060 +2.00    Left -0.25 +0.50 105 +2.00    Expiration Date:  2/19/2020              Pt req bifocal spec rx    RTC 1 yr for dilated eye exam or PRN if any problems.   Discussed above and answered questions.

## 2019-02-25 ENCOUNTER — OFFICE VISIT (OUTPATIENT)
Dept: DIABETES | Facility: CLINIC | Age: 24
End: 2019-02-25
Payer: MEDICAID

## 2019-02-25 VITALS
BODY MASS INDEX: 26.03 KG/M2 | HEIGHT: 68 IN | SYSTOLIC BLOOD PRESSURE: 118 MMHG | DIASTOLIC BLOOD PRESSURE: 78 MMHG | WEIGHT: 171.75 LBS

## 2019-02-25 DIAGNOSIS — E10.3291 MILD NONPROLIFERATIVE DIABETIC RETINOPATHY OF RIGHT EYE WITHOUT MACULAR EDEMA ASSOCIATED WITH TYPE 1 DIABETES MELLITUS: Primary | ICD-10-CM

## 2019-02-25 DIAGNOSIS — E10.42 DIABETIC POLYNEUROPATHY ASSOCIATED WITH TYPE 1 DIABETES MELLITUS: ICD-10-CM

## 2019-02-25 LAB — GLUCOSE SERPL-MCNC: 372 MG/DL (ref 70–110)

## 2019-02-25 PROCEDURE — 82962 GLUCOSE BLOOD TEST: CPT | Mod: PBBFAC,PN | Performed by: PHYSICIAN ASSISTANT

## 2019-02-25 PROCEDURE — 99999 PR PBB SHADOW E&M-EST. PATIENT-LVL III: ICD-10-PCS | Mod: PBBFAC,,, | Performed by: PHYSICIAN ASSISTANT

## 2019-02-25 PROCEDURE — 99214 PR OFFICE/OUTPT VISIT, EST, LEVL IV, 30-39 MIN: ICD-10-PCS | Mod: S$PBB,,, | Performed by: PHYSICIAN ASSISTANT

## 2019-02-25 PROCEDURE — 99213 OFFICE O/P EST LOW 20 MIN: CPT | Mod: PBBFAC,PN | Performed by: PHYSICIAN ASSISTANT

## 2019-02-25 PROCEDURE — 99214 OFFICE O/P EST MOD 30 MIN: CPT | Mod: S$PBB,,, | Performed by: PHYSICIAN ASSISTANT

## 2019-02-25 PROCEDURE — 99999 PR PBB SHADOW E&M-EST. PATIENT-LVL III: CPT | Mod: PBBFAC,,, | Performed by: PHYSICIAN ASSISTANT

## 2019-02-25 RX ORDER — INSULIN LISPRO 100 [IU]/ML
INJECTION, SOLUTION INTRAVENOUS; SUBCUTANEOUS
Qty: 22.5 ML | Refills: 11 | Status: SHIPPED | OUTPATIENT
Start: 2019-02-25 | End: 2019-02-26 | Stop reason: CLARIF

## 2019-02-25 RX ORDER — INSULIN GLARGINE 100 [IU]/ML
45 INJECTION, SOLUTION SUBCUTANEOUS NIGHTLY
Qty: 13.5 ML | Refills: 11 | Status: SHIPPED | OUTPATIENT
Start: 2019-02-25 | End: 2019-05-09 | Stop reason: CLARIF

## 2019-02-25 RX ORDER — TOPIRAMATE 50 MG/1
50 TABLET, FILM COATED ORAL NIGHTLY
Qty: 30 TABLET | Refills: 11 | Status: ON HOLD | OUTPATIENT
Start: 2019-02-25 | End: 2020-06-06 | Stop reason: CLARIF

## 2019-02-25 NOTE — PROGRESS NOTES
Subjective:      Patient ID: Rubi Jose is a 23 y.o. female.    PCP: Carla Stone NP      Rubi Jose is a pleasant 23 y.o. female presenting to follow up on diabetes mellitus. Also, pertinent to this visit in decision making is hypertension, hyperlipidemia, and adherence. She has had diabetes for 5 or more years. Her last visit in Diabetes Management was 11/19/2018. Since that time Rubi has continued to struggle with her glycemic control her hemoglobin A1c was 11.4 and she has not been taking her self-monitoring blood glucose.  However, she does state that she has been having multiple episodes of hypoglycemia.  She continues to experience increased pain in the feet associated with diabetic polyneuropathy and the addition of amitriptyline to the Neurontin has had no affect..  Again I offered her the insulin pump and this time she would like to consider it and be evaluated forward if her insurance will cover.  Her current concerns are glycemic control.    She reports hospital admissions for diabetic ketoacidosis, emergency oom visits, she denies hypoglycemia, syncope, diaphoresis, chest pain, or dyspnea.    She has lost 8 pounds since last visit. Her BMI is 26.11 kg/m².    Her blood sugar in the clinic today was:   Lab Results   Component Value Date    POCGLU 372 (A) 02/25/2019       We discussed the American diabetes Association recommendations:  hemoglobin A1c below 7.0%; all diabetics should be on statins unless contraindicated; one aspirin daily unless contraindicated; fasting blood sugar between 80 and 130 mg/dL; postprandial blood sugar below 180 mg/dl; prevention of hypoglycemia, may adjust goals to higher levels if persistent; ACE or ARB therapy if not contraindicated; and maintain in an ideal body weight with BMI below 25.    Rubi is noncompliant much of the time with DM medications.     Rubi is noncompliant much of the time with lifestyle modifications to include activity and meal  planning.       Current Outpatient Medications:     amitriptyline (ELAVIL) 25 MG tablet, TAKE ONE TABLET BY MOUTH IN THE EVENING, Disp: 30 tablet, Rfl: 11    blood sugar diagnostic (ACCU-CHEK SMARTVIEW TEST STRIP) Strp, 150 strips by Misc.(Non-Drug; Combo Route) route 3 (three) times daily., Disp: 150 strip, Rfl: 0    gabapentin (NEURONTIN) 400 MG capsule, Take 1 capsule (400 mg total) by mouth 3 (three) times daily., Disp: 90 capsule, Rfl: 6    insulin glargine (BASAGLAR KWIKPEN U-100 INSULIN) 100 unit/mL (3 mL) InPn pen, Inject 40 Units into the skin every evening., Disp: 12 mL, Rfl: 11    insulin lispro (ADMELOG SOLOSTAR U-100 INSULIN) 100 unit/mL InPn pen, Titrate up to 20 units subcutaneously three times a day 10-15 min before meals as directed in after visit summary, Disp: 18 mL, Rfl: 3    lancets (ONETOUCH DELICA LANCETS) 33 gauge Misc, 150 lancets by Misc.(Non-Drug; Combo Route) route 3 (three) times daily., Disp: 150 each, Rfl: 0    ranitidine (ZANTAC) 75 MG tablet, Take 75 mg by mouth as needed., Disp: , Rfl:     STANDARDS OF CARE:  Eye doctor: Dr. John, last exam 7/13/17.  Dental exNoam: Recommend regular exams; denies gums bleeding.  Podiatry doctor:  ACE/ARB: No  Statin: No    ACTIVITY LEVEL: She exercises rarely.  BLOOD GLUCOSE TESTING: Self-monitoring with   SOCIAL HISTORY: single. Lives with partner / significant other. unknown occupation no tobacco use    Health Maintenance   Topic Date Due    HPV Vaccines (1 - Female 3-dose series) 11/17/2008    Pneumococcal Vaccine (Medium Risk) (1 of 1 - PPSV23) 11/17/2012    Urine Microalbumin  07/25/2017    Lipid Panel  07/31/2018    Influenza Vaccine  08/01/2018    Hemoglobin A1c  11/29/2018    Pap Smear  05/23/2019    Foot Exam  11/19/2019    Eye Exam  02/18/2020    TETANUS VACCINE  10/27/2020       Diabetes Status:   Diabetes Management Status    Statin: Not taking  ACE/ARB: Not taking    Screening or Prevention Patient's value Goal  Complete/Controlled?   HgA1C Testing and Control   Lab Results   Component Value Date    HGBA1C 11.4 (H) 05/29/2018      Annually/Less than 8% No   Lipid profile : 07/31/2017 Annually No   LDL control Lab Results   Component Value Date    LDLCALC 29.6 (L) 07/31/2017    Annually/Less than 100 mg/dl  No   Nephropathy screening Lab Results   Component Value Date    LABMICR 18.0 07/25/2016     Lab Results   Component Value Date    PROTEINUA Negative 05/28/2018    Annually Yes   Blood pressure BP Readings from Last 1 Encounters:   02/25/19 118/78    Less than 140/90 Yes   Dilated retinal exam : 02/18/2019 Annually No   Foot exam   : 11/19/2018 Annually Yes     The following results were reviewed with patient.    Lab Results   Component Value Date    WBC 5.39 05/29/2018    HGB 11.4 (L) 05/29/2018    HCT 34.2 (L) 05/29/2018     05/29/2018    CHOL 116 (L) 07/31/2017    TRIG 182 (H) 07/31/2017    HDL 50 07/31/2017    LDLCALC 29.6 (L) 07/31/2017    ALT 46 (H) 05/28/2018    AST 38 (H) 05/28/2018     05/29/2018    K 3.6 05/29/2018     05/29/2018    ANIONGAP 12 05/29/2018    CREATININE 0.42 (L) 05/29/2018    ESTGFRAFRICA >60 05/29/2018    EGFRNONAA >60 05/29/2018    BUN 9 05/29/2018    CO2 24 05/29/2018    TSH 1.420 07/30/2017    GLU 96 05/29/2018       Lab Results   Component Value Date    HGBA1C 11.4 (H) 05/29/2018    HGBA1C 13.2 (H) 09/14/2017    HGBA1C 13.6 (H) 07/31/2017       Lab Results   Component Value Date    GLUTAMICACID 3.11 (H) 03/18/2016    CPEPTIDE 0.6 (L) 03/18/2016    T3FREE 1.8 (L) 03/18/2016    FREET4 1.11 03/18/2016    TSH 1.420 07/30/2017    THYROPEROXID <6.0 03/18/2016    EIHQIWXV15 1065 (H) 09/13/2016    CALCIUM 9.0 05/29/2018    PHOS 3.9 05/29/2018       Review of patient's allergies indicates:  No Known Allergies    Past Medical History:   Diagnosis Date    Cataract     Diabetes mellitus, type 2 Diagnosed in 2013    GERD (gastroesophageal reflux disease)     Mild nonproliferative  diabetic retinopathy of right eye without macular edema associated with type 1 diabetes mellitus 2/18/2019       Review of Systems   Constitutional: Negative.  Negative for activity change, appetite change, chills, diaphoresis, fatigue, fever and unexpected weight change.   HENT: Negative.  Negative for congestion, dental problem, drooling, ear discharge, ear pain, facial swelling, hearing loss, mouth sores, nosebleeds, postnasal drip, rhinorrhea, sinus pressure, sneezing, sore throat, tinnitus, trouble swallowing and voice change.    Eyes: Negative.  Negative for photophobia, pain, discharge, redness, itching and visual disturbance.   Respiratory: Negative.  Negative for apnea, cough, choking, chest tightness, shortness of breath, wheezing and stridor.    Cardiovascular: Negative.  Negative for chest pain, palpitations and leg swelling.   Gastrointestinal: Negative.  Negative for abdominal distention, abdominal pain, anal bleeding, blood in stool, constipation, diarrhea, nausea, rectal pain and vomiting.   Endocrine: Negative.  Negative for cold intolerance, heat intolerance, polydipsia, polyphagia and polyuria.   Genitourinary: Negative.  Negative for decreased urine volume, difficulty urinating, dyspareunia, dysuria, enuresis, flank pain, frequency, genital sores, hematuria, menstrual problem, pelvic pain, urgency, vaginal bleeding, vaginal discharge and vaginal pain.   Musculoskeletal: Negative.  Negative for arthralgias, back pain, gait problem, joint swelling, myalgias, neck pain and neck stiffness.   Skin: Negative.  Negative for color change, pallor, rash and wound.   Allergic/Immunologic: Negative.  Negative for environmental allergies, food allergies and immunocompromised state.   Neurological: Negative.  Negative for dizziness, tremors, seizures, syncope, facial asymmetry, speech difficulty, weakness, light-headedness, numbness and headaches.   Hematological: Negative.  Negative for adenopathy. Does not  "bruise/bleed easily.   Psychiatric/Behavioral: Negative.  Negative for agitation, behavioral problems, confusion, decreased concentration, dysphoric mood, hallucinations, self-injury, sleep disturbance and suicidal ideas. The patient is not nervous/anxious and is not hyperactive.       Objective:     Vitals - 1 value per visit 6/13/2018 11/19/2018 2/25/2019   SYSTOLIC 116 124 118   DIASTOLIC 78 84 78   PULSE - - -   TEMPERATURE - - -   RESPIRATIONS - - -   SPO2 - - -   Weight (lb) 166.67 179.45 171.74   Weight (kg) 75.6 81.4 77.9   HEIGHT 5' 8" 5' 8" 5' 8"   BODY MASS INDEX 25.34 27.29 26.11   VISIT REPORT - - -   Pain Score  0 0 0       Physical Exam   Constitutional: She is oriented to person, place, and time. She appears well-developed and well-nourished. She is cooperative.  Non-toxic appearance. She does not have a sickly appearance. She does not appear ill. No distress. She is not intubated.   HENT:   Head: Normocephalic and atraumatic. Not macrocephalic and not microcephalic. Head is without raccoon's eyes, without Cisse's sign, without abrasion, without contusion, without laceration, without right periorbital erythema and without left periorbital erythema. Hair is normal.   Right Ear: Hearing, tympanic membrane, external ear and ear canal normal. No lacerations. No drainage, swelling or tenderness. No foreign bodies. No mastoid tenderness. Tympanic membrane is not injected, not scarred, not perforated, not erythematous, not retracted and not bulging. Tympanic membrane mobility is normal. No middle ear effusion. No hemotympanum. No decreased hearing is noted.   Left Ear: Hearing, tympanic membrane, external ear and ear canal normal. No lacerations. No drainage, swelling or tenderness. No foreign bodies. No mastoid tenderness. Tympanic membrane is not injected, not scarred, not perforated, not erythematous, not retracted and not bulging. Tympanic membrane mobility is normal.  No middle ear effusion. No " hemotympanum. No decreased hearing is noted.   Nose: Nose normal. No mucosal edema, rhinorrhea, nose lacerations, sinus tenderness, nasal deformity, septal deviation or nasal septal hematoma. No epistaxis.  No foreign bodies. Right sinus exhibits no maxillary sinus tenderness and no frontal sinus tenderness. Left sinus exhibits no maxillary sinus tenderness and no frontal sinus tenderness.   Mouth/Throat: Oropharynx is clear and moist. No oropharyngeal exudate.   Eyes: Conjunctivae and EOM are normal. Pupils are equal, round, and reactive to light. Right eye exhibits no chemosis, no discharge and no exudate. No foreign body present in the right eye. Left eye exhibits no chemosis, no discharge, no exudate and no hordeolum. No foreign body present in the left eye. Right conjunctiva is not injected. Right conjunctiva has no hemorrhage. Left conjunctiva is not injected. Left conjunctiva has no hemorrhage. No scleral icterus. Right eye exhibits normal extraocular motion and no nystagmus. Left eye exhibits normal extraocular motion and no nystagmus. Right pupil is round and reactive. Left pupil is round and reactive. Pupils are equal.   Neck: Trachea normal, normal range of motion and full passive range of motion without pain. Neck supple. Normal carotid pulses, no hepatojugular reflux and no JVD present. No tracheal tenderness, no spinous process tenderness and no muscular tenderness present. Carotid bruit is not present. No neck rigidity. No tracheal deviation, no edema, no erythema and normal range of motion present. No thyroid mass and no thyromegaly present.   Cardiovascular: Regular rhythm, normal heart sounds and intact distal pulses.  No extrasystoles are present. Tachycardia present. PMI is not displaced. Exam reveals no gallop, no friction rub and no decreased pulses.   No murmur heard.  Pulmonary/Chest: Effort normal and breath sounds normal. No accessory muscle usage or stridor. No apnea, no tachypnea and no  bradypnea. She is not intubated. No respiratory distress. She has no decreased breath sounds. She has no wheezes. She has no rhonchi. She has no rales. Chest wall is not dull to percussion. She exhibits no mass, no tenderness, no bony tenderness, no laceration, no crepitus, no edema, no deformity, no swelling and no retraction.   Abdominal: Soft. Normal appearance and bowel sounds are normal. She exhibits no shifting dullness, no distension, no pulsatile liver, no fluid wave, no abdominal bruit, no ascites, no pulsatile midline mass and no mass. There is no hepatosplenomegaly, splenomegaly or hepatomegaly. There is no tenderness. There is no rigidity, no rebound, no guarding, no CVA tenderness, no tenderness at McBurney's point and negative Juarez's sign.   Musculoskeletal: Normal range of motion. She exhibits no edema or tenderness.   Lymphadenopathy:        Head (right side): No submental, no submandibular, no tonsillar, no preauricular, no posterior auricular and no occipital adenopathy present.        Head (left side): No submental, no submandibular, no tonsillar, no preauricular, no posterior auricular and no occipital adenopathy present.     She has no cervical adenopathy.        Right cervical: No superficial cervical, no deep cervical and no posterior cervical adenopathy present.       Left cervical: No superficial cervical, no deep cervical and no posterior cervical adenopathy present.     She has no axillary adenopathy.   Neurological: She is alert and oriented to person, place, and time. She has normal reflexes. She is not disoriented. She displays no atrophy, no tremor and normal reflexes. No cranial nerve deficit or sensory deficit. She exhibits normal muscle tone. She displays no seizure activity. Coordination and gait normal.   Reflex Scores:       Bicep reflexes are 2+ on the right side and 2+ on the left side.       Brachioradialis reflexes are 2+ on the right side and 2+ on the left side.        Patellar reflexes are 2+ on the right side and 2+ on the left side.  Skin: Skin is warm and dry. No abrasion, no bruising, no burn, no ecchymosis, no laceration, no lesion, no petechiae, no purpura and no rash noted. Rash is not macular, not papular, not maculopapular, not nodular, not pustular, not vesicular and not urticarial. She is not diaphoretic. No cyanosis or erythema. No pallor. Nails show no clubbing.   Psychiatric: She has a normal mood and affect. Her behavior is normal. Judgment and thought content normal. Her mood appears not anxious. Her affect is not angry, not blunt and not labile. Her speech is not rapid and/or pressured, not delayed, not tangential and not slurred. She is not agitated, not aggressive, not hyperactive, not slowed, not withdrawn, not actively hallucinating and not combative. Thought content is not paranoid and not delusional. Cognition and memory are not impaired. She does not express impulsivity or inappropriate judgment. She does not exhibit a depressed mood. She expresses no homicidal and no suicidal ideation. She expresses no suicidal plans and no homicidal plans. She is communicative. She exhibits normal recent memory and normal remote memory. She is attentive.   Nursing note and vitals reviewed.    Assessment:     1. 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       She has elected the following treatments.     Rubi Jose is seen today for   1. 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 Type I diabetes mellitus with manifestations, uncontrolled     3. Diabetic polyneuropathy associated with type 1 diabetes mellitus      We have discussed the etiology and treatment options associated with the diagnosis as well as alternatives. She has elected the following treatments.     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 associated with type 1 diabetes mellitus  -     POCT Glucose, Hand-Held Device  -     Hemoglobin A1c; Future; Expected date: 02/25/2019  -     Renal function panel; Future; Expected date: 02/25/2019  -     ALT (SGPT); Future; Expected date: 02/25/2019  -     AST (SGOT); Future; Expected date: 02/25/2019  -     Lipid panel; Future; Expected date: 02/25/2019  -     TSH; Future; Expected date: 02/25/2019  -     Protein / creatinine ratio, urine; Future    Type I diabetes mellitus with manifestations, uncontrolled  -     Hemoglobin A1c; Future; Expected date: 02/25/2019  -     Renal function panel; Future; Expected date: 02/25/2019  -     ALT (SGPT); Future; Expected date: 02/25/2019  -     AST (SGOT); Future; Expected date: 02/25/2019  -     Lipid panel; Future; Expected date: 02/25/2019  -     TSH; Future; Expected date: 02/25/2019  -     Protein / creatinine ratio, urine; Future    Diabetic polyneuropathy associated with type 1 diabetes mellitus  -     topiramate (TOPAMAX) 50 MG tablet; Take 1 tablet (50 mg total) by mouth every evening.  Dispense: 30 tablet; Refill: 11    Other orders  -     insulin lispro (ADMELOG SOLOSTAR U-100 INSULIN) 100 unit/mL pen; Titrate up to 25 units subcutaneously three times a day 10-15 min before meals as directed in after visit summary  Dispense: 22.5 mL; Refill: 11  -     insulin (BASAGLAR KWIKPEN U-100 INSULIN) glargine 100 units/mL (3mL) SubQ pen; Inject 45 Units into the skin every evening.  Dispense: 13.5 mL; Refill: 11    Diabetic ketoacidosis without coma associated with type 1 diabetes mellitus  -     POCT Glucose, Hand-Held Device  - Again, I have discussed with her her her  "imperative need to take her insulin on a scheduled basis  - I have intensify her insulin as noted below in the treatment to target.  - a gain she was advised to monitor her blood sugar a minimum of 4 times daily  - she is to notify me of any changes in her diabetic status either hyper or hypo said it may be addressed before it becomes emergent.  - tandem insulin pump  - will follow up in the 12 weeks    1.) Patient was instructed to continue to monitor blood glucose 4 times daily. Reminded to bring BG meter or record to each visit for review.  2.) Reviewed pathophysiology of diabetes, complications related to the disease, importance of annual dilated eye exam and self daily foot examination.  3.) Continue medications as prescribed [facility] Basaglar and Admelog. [facility] or Phone review in 1 week with BG records for adjustment of medication.  4.) Advised patient to continue to follow up with Dietician/CDE as directed.  5.) Discussed activity, benefits, methods, and precautions. Recommended patient start/continue some form of exercise and increase as tolerated to 60 minutes per day to facilitate weight loss and aid in control of BGs. Also reminded patient of WHO recommendation of 10,000 steps daily as a goal.   6.) A1C, TSH, Lipid Panel, CMP/renal panel with eGFR and Micro/Creatinine prior to next visit, if not already done.  7.) Return to clinic in 12 weeks for follow up. Advised patient to call clinic with any questions or concerns.    I have reviewed your results and they are still quite high. I would like you to adjust "Treating to Target". The treatment will be Insulin and your target will be the Fasting and 2 hour post meal blood sugar. It will work in this manner;    1. Goal for Fasting blood sugar is  mg/dl. I realize that you will need time to adjust to the new levels and presently you may feel too low if you are too aggressive now. So go slow and aim to lower your blood sugar to below 200 then 150 " "then 100 over several months.    2. Goal for 2 hour post meal blood sugar is below 180 mg/dl, here the same rules apply as in #1.    3. You will check your fasting blood sugar daily, if not where we would like it to be over a 3 day period then that evening we will increase the Basaglar dose by 5 units. Then repeat the process over the next 3 days. Remember this is a slow process and take our time getting to goal. But, each week should be better than prior weeks. Blood sugars below 70 are unacceptable and should raise a "RED FLAG" where we may have to reduce our dose of insulin.    4. You will check your post meal glucose daily as well. However, each day you will check a different meal, (ie. Monday-breakfast; Tuesday- lunch; Wednesday- supper, then repeat). If your post meal glucose is not where we would like, increase pre-meal insulin by 2 units next time. A word of CAUTION: mealtime insulin is dependant on the size and concentration of your meal content. If not consuming a large meal do not take large dose of insulin. Use the reasonable person rule.     5. If you have any questions please do not hesitate to call.    Intensive insulin Therapy with correction factor:    You are on Intensive insulin therapy with Basal and Bolus insulin. Lantus, Levamir or NPH is your Basal insulin and will help maintain your fasting and between meal sugar. Your fast acting or rescue insulin is either Humalog, Novalog or Regular insulin and will control your post meal sugar.     You will Take 40 units of Basaglar at 9 pm each night. This will be adjusted up or down depending on your fasting blood sugar before breakfast.    Humalog will follow this pre meal schedule; Correction factor of "2 units per 50 mg/dl" and is based on 30-60 grams of carbohydrates per meal.    If blood sugar is below 70 eat first then check your blood sugar 2 hours later and make correction.  If blood pre-meal sugar is  70 -150 take 14 units of Admelog;  If blood " pre-meal sugar is 151-200 take +2 units of Admelog;  If blood pre-meal sugar is 201-250 take +4 units of Admelog;  If blood pre-meal sugar is 251-300 take +6 units of Admelog;  If blood pre-meal sugar is 301-350 take +8 units of Admelog;  If blood pre-meal sugar is 351-400+ take +10 units of Admelog;  Also increase water intake and call for appointment.    A total of 30 minutes was spent in face to face time, of which 50 % was spent in counseling patient on disease process, complications, treatment, and side effects of medications.    The patient was explained the above plan and given opportunity to ask questions.  She understands, chooses and consents to this plan and accepts all the risks, which include but are not limited to the risks mentioned above.   She understands the alternative of having no testing, interventions or treatments at this time. She left content and without further questions.     Disclaimer:  This note is prepared using voice recognition software and as such is likely to have errors and has not been proof read. Please contact me for questions.

## 2019-02-26 RX ORDER — NAPROXEN SODIUM 220 MG
1 TABLET ORAL 3 TIMES DAILY
Qty: 100 EACH | Refills: 11 | Status: ON HOLD | OUTPATIENT
Start: 2019-02-26 | End: 2020-02-26 | Stop reason: SDUPTHER

## 2019-02-26 RX ORDER — INSULIN LISPRO 100 [IU]/ML
25 INJECTION, SOLUTION INTRAVENOUS; SUBCUTANEOUS
Qty: 22.5 ML | Refills: 11 | Status: SHIPPED | OUTPATIENT
Start: 2019-02-26 | End: 2019-05-21 | Stop reason: CLARIF

## 2019-02-26 NOTE — TELEPHONE ENCOUNTER
----- Message from Flavio Groves sent at 2/26/2019 10:50 AM CST -----  Contact: [facility] [phone]  .Type:  Pharmacy Calling to Clarify an RX    Name of Caller:Donn  Pharmacy Name:[facility]  Prescription Name:Admelog  What do they need to clarify?:medication not covered by ins  Best Call Back Number:[phone]  Additional Information:

## 2019-04-12 DIAGNOSIS — E11.42 DIABETIC PERIPHERAL NEUROPATHY: ICD-10-CM

## 2019-04-12 NOTE — TELEPHONE ENCOUNTER
----- Message from Gerri Blankenship sent at 4/12/2019  4:24 PM CDT -----  Contact: [phone]  Would like to consult with the nurse, patient would like to know if the dr would send a fax over to [facility] so she can her medication, patients states that she is out, patients states that she do not have an appt not until next month, please call back at [phone], thanks sj

## 2019-04-14 RX ORDER — GABAPENTIN 400 MG/1
400 CAPSULE ORAL 3 TIMES DAILY
Qty: 90 CAPSULE | Refills: 6 | Status: ON HOLD | OUTPATIENT
Start: 2019-04-14 | End: 2020-06-06 | Stop reason: CLARIF

## 2019-05-08 ENCOUNTER — TELEPHONE (OUTPATIENT)
Dept: DIABETES | Facility: CLINIC | Age: 24
End: 2019-05-08

## 2019-05-08 NOTE — TELEPHONE ENCOUNTER
----- Message from Lucille Conti sent at 5/8/2019 11:38 AM CDT -----  Contact: Patient  Patient is calling to see if she can get a prior authorization for her Insulin she was told that her insurance want cover it, Please call her at [phone].    [address] [address]  Phone: [phone] Fax: [phone]    Thanks  Td

## 2019-05-09 RX ORDER — INSULIN GLARGINE 100 [IU]/ML
45 INJECTION, SOLUTION SUBCUTANEOUS NIGHTLY
Qty: 40.5 ML | Refills: 3 | Status: ON HOLD | OUTPATIENT
Start: 2019-05-09 | End: 2020-02-26 | Stop reason: SDUPTHER

## 2019-05-09 NOTE — TELEPHONE ENCOUNTER
----- Message from Ann Villegas sent at 5/9/2019  8:44 AM CDT -----  Contact: Patient  Patient called and stated that she got in touch with her insurance. The Lantus is covered, but needs a Prior Auth. She would like a prescription sent into the pharmacy for her.    [address] [address]  Phone: [phone] Fax: [phone]    She can be contacted at [phone].    Thanks,  Ann

## 2019-05-21 RX ORDER — INSULIN LISPRO 100 [IU]/ML
25 INJECTION, SOLUTION INTRAVENOUS; SUBCUTANEOUS
Qty: 22.5 ML | Refills: 6 | Status: ON HOLD | OUTPATIENT
Start: 2019-05-21 | End: 2020-02-26 | Stop reason: SDUPTHER

## 2019-08-28 NOTE — TELEPHONE ENCOUNTER
----- Message from Hortencia Epstein sent at 8/28/2019  1:26 PM CDT -----  Contact: self [phone]  Type:  RX Refill Request    Who Called: Rubi Jose  Refill or New Rx:refill  RX Name and Strength: needles for quickpen  How is the patient currently taking it? (ex. 1XDay):1x day  Is this a 30 day or 90 day RX: 90 day  Preferred Pharmacy with phone number:    [address] [address]  Phone: [phone] Fax: [phone]    Local or Mail Order:local  Ordering Provider:Wiliam  Would the patient rather a call back or a response via [employer]?  Call back  Best Call Back Number:[phone]  Additional Information:

## 2019-08-29 RX ORDER — PEN NEEDLE, DIABETIC 30 GX3/16"
1 NEEDLE, DISPOSABLE MISCELLANEOUS 3 TIMES DAILY
Qty: 100 EACH | Refills: 3 | Status: ON HOLD | OUTPATIENT
Start: 2019-08-29 | End: 2020-02-25 | Stop reason: CLARIF

## 2019-09-09 ENCOUNTER — OFFICE VISIT (OUTPATIENT)
Dept: DIABETES | Facility: CLINIC | Age: 24
End: 2019-09-09
Payer: MEDICAID

## 2019-09-09 ENCOUNTER — LAB VISIT (OUTPATIENT)
Dept: LAB | Facility: HOSPITAL | Age: 24
End: 2019-09-09
Attending: PHYSICIAN ASSISTANT
Payer: MEDICAID

## 2019-09-09 VITALS
HEIGHT: 68 IN | BODY MASS INDEX: 27.26 KG/M2 | DIASTOLIC BLOOD PRESSURE: 77 MMHG | WEIGHT: 179.88 LBS | SYSTOLIC BLOOD PRESSURE: 110 MMHG

## 2019-09-09 DIAGNOSIS — E10.10 TYPE 1 DIABETES MELLITUS WITH KETOACIDOSIS WITHOUT COMA: ICD-10-CM

## 2019-09-09 DIAGNOSIS — E10.10 TYPE 1 DIABETES MELLITUS WITH KETOACIDOSIS WITHOUT COMA: Primary | ICD-10-CM

## 2019-09-09 LAB — GLUCOSE SERPL-MCNC: 395 MG/DL (ref 70–110)

## 2019-09-09 PROCEDURE — 82962 GLUCOSE BLOOD TEST: CPT | Mod: PBBFAC | Performed by: PHYSICIAN ASSISTANT

## 2019-09-09 PROCEDURE — 99999 PR PBB SHADOW E&M-EST. PATIENT-LVL III: CPT | Mod: PBBFAC,,, | Performed by: PHYSICIAN ASSISTANT

## 2019-09-09 PROCEDURE — 99999 PR PBB SHADOW E&M-EST. PATIENT-LVL III: ICD-10-PCS | Mod: PBBFAC,,, | Performed by: PHYSICIAN ASSISTANT

## 2019-09-09 PROCEDURE — 83036 HEMOGLOBIN GLYCOSYLATED A1C: CPT

## 2019-09-09 PROCEDURE — 36415 COLL VENOUS BLD VENIPUNCTURE: CPT

## 2019-09-09 PROCEDURE — 99213 OFFICE O/P EST LOW 20 MIN: CPT | Mod: PBBFAC | Performed by: PHYSICIAN ASSISTANT

## 2019-09-09 PROCEDURE — 99214 OFFICE O/P EST MOD 30 MIN: CPT | Mod: S$PBB,,, | Performed by: PHYSICIAN ASSISTANT

## 2019-09-09 PROCEDURE — 99214 PR OFFICE/OUTPT VISIT, EST, LEVL IV, 30-39 MIN: ICD-10-PCS | Mod: S$PBB,,, | Performed by: PHYSICIAN ASSISTANT

## 2019-09-09 NOTE — PATIENT INSTRUCTIONS
"I have reviewed your results and they are still quite high. I would like you to adjust "Treating to Target". The treatment will be Insulin and your target will be the Fasting and 2 hour post meal blood sugar. It will work in this manner;    1. Goal for Fasting blood sugar is  mg/dl. I realize that you will need time to adjust to the new levels and presently you may feel too low if you are too aggressive now. So go slow and aim to lower your blood sugar to below 200 then 150 then 100 over several months.    2. Goal for 2 hour post meal blood sugar is below 180 mg/dl, here the same rules apply as in #1.    3. You will check your fasting blood sugar daily, if not where we would like it to be over a 3 day period then that evening we will increase the Basaglar dose by 5 units. Then repeat the process over the next 3 days. Remember this is a slow process and take our time getting to goal. But, each week should be better than prior weeks. Blood sugars below 70 are unacceptable and should raise a "RED FLAG" where we may have to reduce our dose of insulin.    4. You will check your post meal glucose daily as well. However, each day you will check a different meal, (ie. Monday-breakfast; Tuesday- lunch; Wednesday- supper, then repeat). If your post meal glucose is not where we would like, increase pre-meal insulin by 2 units next time. A word of CAUTION: mealtime insulin is dependant on the size and concentration of your meal content. If not consuming a large meal do not take large dose of insulin. Use the reasonable person rule.     5. If you have any questions please do not hesitate to call.    Intensive insulin Therapy with correction factor:    You are on Intensive insulin therapy with Basal and Bolus insulin. Lantus, Levamir or NPH is your Basal insulin and will help maintain your fasting and between meal sugar. Your fast acting or rescue insulin is either Humalog, Novalog or Regular insulin and will control your " "post meal sugar.     You will Take 45 units of Lantus at 9 pm each night. This will be adjusted up or down depending on your fasting blood sugar before breakfast.    Humalog will follow this pre meal schedule; Correction factor of "2 units per 50 mg/dl" and is based on 30-60 grams of carbohydrates per meal.    If blood sugar is below 70 eat first then check your blood sugar 2 hours later and make correction.  If blood pre-meal sugar is  70 -150 take 14 units of Humalog;  If blood pre-meal sugar is 151-200 take +2 units of Humalog;  If blood pre-meal sugar is 201-250 take +4 units of Humalog;  If blood pre-meal sugar is 251-300 take +6 units of Humalog;  If blood pre-meal sugar is 301-350 take +8 units of Humalog;  If blood pre-meal sugar is 351-400+ take +10 units of Admelog;  Also increase water intake and call for appointment.  "

## 2019-09-09 NOTE — PROGRESS NOTES
Subjective:      Patient ID: Rubi Jose is a 23 y.o. female.    PCP: Carla Stone NP      Rubi Jose is a pleasant 23 y.o. female presenting to follow up on Type 1 diabetes mellitus.  Also, pertinent to this visit in decision making is hypertension, hyperlipidemia, and adherence.  She has had diabetes for 6 or more years.  Her last visit in Diabetes Management was 2/25/2019 .   Since that time she has been in her usual state of health without significant hyperglycemia or hypoglycemia. She has been checking her blood glucose regularly four times daily, before meals and HS.  Since that time she has had moderate improvement in her glycemia with A1c of 12.7%.   She is currently on MDI with Basaglar and Admelog.  Her current concerns are glycemic control.    She denies hospital admissions, emergency oom visits,  hypoglycemia, syncope, diaphoresis, chest pain, or dyspnea.    She has lost 8 pounds since last visit. Her BMI is 27.35 kg/m².    Her blood sugar in the clinic today was:   Lab Results   Component Value Date    POCGLU 372 (A) 02/25/2019       Rubi is noncompliant much of the time with DM medications.     Rubi is noncompliant much of the time with lifestyle modifications to include activity and meal planning.     Diabetes Management Status    Statin: Not taking  ACE/ARB: Not taking    Screening or Prevention Patient's value Goal Complete/Controlled?   HgA1C Testing and Control   Lab Results   Component Value Date    HGBA1C 12.7 (H) 02/25/2019      Annually/Less than 8% No   Lipid profile : 02/25/2019 Annually No   LDL control Lab Results   Component Value Date    LDLCALC 46.4 (L) 02/25/2019    Annually/Less than 100 mg/dl  No   Nephropathy screening Lab Results   Component Value Date    LABMICR 18.0 07/25/2016     Lab Results   Component Value Date    PROTEINUA Negative 05/28/2018    Annually Yes   Blood pressure BP Readings from Last 1 Encounters:   09/09/19 110/77    Less than 140/90 Yes  "  Dilated retinal exam : 02/18/2019 Annually No   Foot exam   : 02/25/2019 Annually Yes         Lab Results   Component Value Date    HGBA1C 12.7 (H) 02/25/2019    HGBA1C 11.4 (H) 05/29/2018    HGBA1C 13.2 (H) 09/14/2017       Lab Results   Component Value Date    CPEPTIDE 0.6 (L) 03/18/2016    GLUTAMICACID 3.11 (H) 03/18/2016       Review of Systems   Constitutional: Negative for activity change and unexpected weight change.   Eyes: Negative for visual disturbance.   Respiratory: Negative for chest tightness and shortness of breath.    Cardiovascular: Negative for chest pain.   Gastrointestinal: Negative for constipation and diarrhea.   Endocrine: Negative for cold intolerance, heat intolerance, polydipsia, polyphagia and polyuria.   Genitourinary: Negative for frequency.   Skin: Negative for wound.   Neurological: Negative for numbness.   Psychiatric/Behavioral: Negative for confusion.      Objective:   /77   Ht 5' 8" (1.727 m)   Wt 81.6 kg (179 lb 14.3 oz)   BMI 27.35 kg/m²       Physical Exam   Constitutional: She is oriented to person, place, and time. She appears well-developed and well-nourished. No distress.   Neck: Normal range of motion. Neck supple. No tracheal deviation present. No thyromegaly present.   Cardiovascular: Normal rate, regular rhythm and normal heart sounds.   Pulmonary/Chest: Effort normal and breath sounds normal.   Abdominal: Soft. Bowel sounds are normal.   Musculoskeletal: She exhibits no edema.   Lymphadenopathy:     She has no cervical adenopathy.   Neurological: She is alert and oriented to person, place, and time.   Skin: She is not diaphoretic.   Psychiatric: She has a normal mood and affect.   Nursing note and vitals reviewed.    Assessment:     1. Type 1 diabetes mellitus with manifestations, uncontrolled       Plan:   Rubi Jose is seen today for   1. Type 1 diabetes mellitus with manifestations, uncontrolled        Type 1 diabetes mellitus with manifestations, " "uncontrolled  -     POCT Glucose, Hand-Held Device    Diabetic ketoacidosis without coma associated with type 1 diabetes mellitus  -     POCT Glucose, Hand-Held Device  - Again, I have discussed with her the imperative need to take her insulin on a scheduled basis  - I have intensify her insulin as noted below in the treatment to target.  - again she was advised to monitor her blood sugar a minimum of 4 times daily  - she is to notify me of any changes in her diabetic status either hyper or hypo said it may be addressed before it becomes emergent.  - Medtronic insulin pump  - will follow up in the 12 weeks      I have reviewed your results and they are still quite high. I would like you to adjust "Treating to Target". The treatment will be Insulin and your target will be the Fasting and 2 hour post meal blood sugar. It will work in this manner;    1. Goal for Fasting blood sugar is  mg/dl. I realize that you will need time to adjust to the new levels and presently you may feel too low if you are too aggressive now. So go slow and aim to lower your blood sugar to below 200 then 150 then 100 over several months.    2. Goal for 2 hour post meal blood sugar is below 180 mg/dl, here the same rules apply as in #1.    3. You will check your fasting blood sugar daily, if not where we would like it to be over a 3 day period then that evening we will increase the Basaglar dose by 5 units. Then repeat the process over the next 3 days. Remember this is a slow process and take our time getting to goal. But, each week should be better than prior weeks. Blood sugars below 70 are unacceptable and should raise a "RED FLAG" where we may have to reduce our dose of insulin.    4. You will check your post meal glucose daily as well. However, each day you will check a different meal, (ie. Monday-breakfast; Tuesday- lunch; Wednesday- supper, then repeat). If your post meal glucose is not where we would like, increase pre-meal " "insulin by 2 units next time. A word of CAUTION: mealtime insulin is dependant on the size and concentration of your meal content. If not consuming a large meal do not take large dose of insulin. Use the reasonable person rule.     5. If you have any questions please do not hesitate to call.    Intensive insulin Therapy with correction factor:    You are on Intensive insulin therapy with Basal and Bolus insulin. Lantus, Levamir or NPH is your Basal insulin and will help maintain your fasting and between meal sugar. Your fast acting or rescue insulin is either Humalog, Novalog or Regular insulin and will control your post meal sugar.     You will Take 45 units of Lantus at 9 pm each night. This will be adjusted up or down depending on your fasting blood sugar before breakfast.    Humalog will follow this pre meal schedule; Correction factor of "2 units per 50 mg/dl" and is based on 30-60 grams of carbohydrates per meal.    If blood sugar is below 70 eat first then check your blood sugar 2 hours later and make correction.  If blood pre-meal sugar is  70 -150 take 14 units of Humalog;  If blood pre-meal sugar is 151-200 take +2 units of Humalog;  If blood pre-meal sugar is 201-250 take +4 units of Humalog;  If blood pre-meal sugar is 251-300 take +6 units of Humalog;  If blood pre-meal sugar is 301-350 take +8 units of Humalog;  If blood pre-meal sugar is 351-400+ take +10 units of Admelog;  Also increase water intake and call for appointment.    A total of 30 minutes was spent in face to face time, of which 50 % was spent in counseling patient on disease process, complications, treatment, and side effects of medications.    The patient was explained the above plan and given opportunity to ask questions.  She understands, chooses and consents to this plan and accepts all the risks, which include but are not limited to the risks mentioned above.   She understands the alternative of having no testing, interventions or " treatments at this time. She left content and without further questions.     Disclaimer:  This note is prepared using voice recognition software and as such is likely to have errors and has not been proof read. Please contact me for questions.

## 2019-09-10 LAB
ESTIMATED AVG GLUCOSE: 324 MG/DL (ref 68–131)
HBA1C MFR BLD HPLC: 12.9 % (ref 4–5.6)

## 2019-09-11 DIAGNOSIS — E10.10 TYPE 1 DIABETES MELLITUS WITH KETOACIDOSIS WITHOUT COMA: Primary | ICD-10-CM

## 2019-09-16 ENCOUNTER — LAB VISIT (OUTPATIENT)
Dept: LAB | Facility: HOSPITAL | Age: 24
End: 2019-09-16
Attending: PHYSICIAN ASSISTANT
Payer: MEDICAID

## 2019-09-16 DIAGNOSIS — E10.10 TYPE 1 DIABETES MELLITUS WITH KETOACIDOSIS WITHOUT COMA: ICD-10-CM

## 2019-09-16 LAB
C PEPTIDE SERPL-MCNC: <0.08 NG/ML (ref 0.78–5.19)
GLUCOSE SERPL-MCNC: 220 MG/DL (ref 70–110)

## 2019-09-16 PROCEDURE — 36415 COLL VENOUS BLD VENIPUNCTURE: CPT

## 2019-09-16 PROCEDURE — 84681 ASSAY OF C-PEPTIDE: CPT

## 2019-09-16 PROCEDURE — 82947 ASSAY GLUCOSE BLOOD QUANT: CPT

## 2019-10-31 ENCOUNTER — CLINICAL SUPPORT (OUTPATIENT)
Dept: DIABETES | Facility: CLINIC | Age: 24
End: 2019-10-31

## 2019-10-31 DIAGNOSIS — E10.9 TYPE 1 DIABETES MELLITUS WITHOUT COMPLICATIONS: Primary | ICD-10-CM

## 2019-10-31 PROCEDURE — G0108 DIAB MANAGE TRN  PER INDIV: HCPCS | Mod: PBBFAC | Performed by: DIETITIAN, REGISTERED

## 2019-11-01 ENCOUNTER — TELEPHONE (OUTPATIENT)
Dept: DIABETES | Facility: CLINIC | Age: 24
End: 2019-11-01

## 2019-11-01 NOTE — TELEPHONE ENCOUNTER
Called to check on patient since pump was started yesterday. She is doing very well. No problems using pump. Pt stated that fasting BG was 191 this am. This is much better than what fasting BG usually is.

## 2019-11-01 NOTE — PROGRESS NOTES
DIABETES EDUCATOR NOTE -  MINIMED 670G     :1995  REFERRING PHYSICIAN: Mansi Swain Jr.*      Patient is here today for training on the MiniMed 670G  insulin pump training.   Pump and sensor training provided per Medtronic/Minimed protocol by [employer] , Jyoti Horta RN.     Details of pump therapy were covered with the patient. Instructed and assisted in programming pump following Medtronic Training Packet step by step guide.      Features were reviewed in detail: basal menu options included setting a temp basal rate, performing a set change with proper technique, bolus menu options including use of bolus wizard and preset bolus options. Patient  demonstrated the ability to fill pump reservoir and prime infusion set adequately per sterile technique. Patient inserted the infusion set with aseptic technique and secured it to abdomen. Reviewed site selection, rotation.     Discussed  storage of insulin and back-up plan if pump is broken or fails. Reviewed  treatment of hypoglycemia, hyperglycemia and troubleshooting of pump. Refer to Quick - Reference Safety rules .    INITIAL SETTINGS:   Basal rate:  12a, 0.8u/hr // 3a, 1.5u/hr // 6a, 1.3u/hr // 12p, 1.15u/hr // 5p, 1.3u/hr // 9p, 0.8u/hr   Max Basal rate 2u/hr    Bolus Wizard:    CHO RATIO: 8  Insulin Sensitivity : 31  Blood glucose goal: 100-120  Active insulin: 4 hrs     Medtronic MobiWorked 24 hour support line provided    Follow-up Plan:   Patient will set up Medtronic Care-link pump download program.  F/u with CDE in 2 weeks - will start auto mode. Pt will be trained on sensor by Med[employer] .      Counseling time: 3 hrs

## 2019-11-13 ENCOUNTER — CLINICAL SUPPORT (OUTPATIENT)
Dept: DIABETES | Facility: CLINIC | Age: 24
End: 2019-11-13

## 2019-11-13 VITALS — WEIGHT: 198.44 LBS | HEIGHT: 68 IN | BODY MASS INDEX: 30.07 KG/M2

## 2019-11-13 DIAGNOSIS — E10.9 TYPE 1 DIABETES MELLITUS WITHOUT COMPLICATIONS: Primary | ICD-10-CM

## 2019-11-13 PROCEDURE — 99211 OFF/OP EST MAY X REQ PHY/QHP: CPT | Mod: PBBFAC | Performed by: DIETITIAN, REGISTERED

## 2019-11-13 PROCEDURE — 99999 PR PBB SHADOW E&M-EST. PATIENT-LVL I: ICD-10-PCS | Mod: PBBFAC,,, | Performed by: DIETITIAN, REGISTERED

## 2019-11-13 PROCEDURE — G0108 DIAB MANAGE TRN  PER INDIV: HCPCS | Mod: PBBFAC | Performed by: DIETITIAN, REGISTERED

## 2019-11-13 PROCEDURE — 99999 PR PBB SHADOW E&M-EST. PATIENT-LVL I: CPT | Mod: PBBFAC,,, | Performed by: DIETITIAN, REGISTERED

## 2019-11-13 NOTE — PROGRESS NOTES
Pt here today for Medtronic 670G automode training. Training provided by Jyoti Horta, with [employer], per Medtronic training protocol following Getting Started Guide.   Pt has been using pump in manual mode; started Medtronic CGM 5 days ago.     Pump and CGM downloaded and reviewed. No changes made.    Pt then entered Smart Guard menu and turned Auto Mode feature to on.     Reviewed new choices available in menu: checking Auto Mode Readiness status screen any time grey shield appears reflecting in safe basal mode, will remain in safe basal up to 90 min or until required steps are addressed, and always check for blue shield reflecting auto mode before bed.     In office today, readiness status screen shows BG calibration is required to start auto mode. BG reading taken. Pt successfully enter calibration and left clinic with pump in auto mode.     Pt to f/u with HCP in 2-3 wks (when Medicaid is approved)

## 2019-12-26 DIAGNOSIS — E10.649 TYPE 1 DIABETES MELLITUS WITH HYPOGLYCEMIA AND WITHOUT COMA: ICD-10-CM

## 2019-12-27 ENCOUNTER — PATIENT MESSAGE (OUTPATIENT)
Dept: DIABETES | Facility: CLINIC | Age: 24
End: 2019-12-27

## 2019-12-27 RX ORDER — AMITRIPTYLINE HYDROCHLORIDE 25 MG/1
TABLET, FILM COATED ORAL
Qty: 30 TABLET | Refills: 0 | Status: ON HOLD | OUTPATIENT
Start: 2019-12-27 | End: 2020-06-06 | Stop reason: CLARIF

## 2020-02-07 ENCOUNTER — TELEPHONE (OUTPATIENT)
Dept: DIABETES | Facility: CLINIC | Age: 25
End: 2020-02-07

## 2020-02-07 NOTE — TELEPHONE ENCOUNTER
Contacted pt regarding medication refill for Gabapentin, advised pt to send refill request to her PCP as she has not established care with a new provider in Diabetes. Pt verbalized understanding and phone call ended pleasantly.

## 2020-02-24 PROBLEM — R53.1 WEAKNESS: Status: ACTIVE | Noted: 2020-02-24

## 2020-06-04 PROBLEM — E11.10 DKA (DIABETIC KETOACIDOSES): Status: ACTIVE | Noted: 2018-05-29

## 2020-06-04 PROBLEM — R07.9 CHEST PAIN: Status: ACTIVE | Noted: 2020-06-04

## 2020-06-06 PROBLEM — E11.10 DKA (DIABETIC KETOACIDOSES): Status: RESOLVED | Noted: 2018-05-29 | Resolved: 2020-06-06

## 2020-07-14 ENCOUNTER — OCCUPATIONAL HEALTH (OUTPATIENT)
Dept: URGENT CARE | Facility: CLINIC | Age: 25
End: 2020-07-14

## 2020-07-14 DIAGNOSIS — Z02.1 PRE-EMPLOYMENT EXAMINATION: Primary | ICD-10-CM

## 2020-07-14 LAB
CTP QC/QA: YES
POC 10 PANEL DRUG SCREEN: NEGATIVE

## 2020-07-14 PROCEDURE — 90739 HEPB VACC 2/4 DOSE ADULT IM: CPT | Mod: S$GLB,,, | Performed by: FAMILY MEDICINE

## 2020-07-14 PROCEDURE — 86580 TB INTRADERMAL TEST: CPT | Mod: S$GLB,,, | Performed by: FAMILY MEDICINE

## 2020-07-14 PROCEDURE — 86799 MEASLES ANTIBODIES (IGG, IGM): CPT | Mod: S$GLB,,, | Performed by: FAMILY MEDICINE

## 2020-07-14 PROCEDURE — 86580 POCT TB SKIN TEST: ICD-10-PCS | Mod: S$GLB,,, | Performed by: FAMILY MEDICINE

## 2020-07-14 PROCEDURE — 86787 VARICELLA-ZOSTER ANTIBODY: CPT | Mod: S$GLB,,, | Performed by: FAMILY MEDICINE

## 2020-07-14 PROCEDURE — 86799 MEASLES ANTIBODIES (IGG, IGM): ICD-10-PCS | Mod: S$GLB,,, | Performed by: FAMILY MEDICINE

## 2020-07-14 PROCEDURE — 90739 HEPATITIS B (RECOMBINANT) ADJUVANTED, 2 DOSE: ICD-10-PCS | Mod: S$GLB,,, | Performed by: FAMILY MEDICINE

## 2020-07-14 PROCEDURE — 86787 VARICELLA ZOSTER ANTIBODY, IGG: ICD-10-PCS | Mod: S$GLB,,, | Performed by: FAMILY MEDICINE

## 2020-07-14 PROCEDURE — 80305 DRUG TEST PRSMV DIR OPT OBS: CPT | Mod: QW,S$GLB,, | Performed by: FAMILY MEDICINE

## 2020-07-14 PROCEDURE — 99499 PHYSICAL, BASIC COMPLEXITY: ICD-10-PCS | Mod: S$GLB,,, | Performed by: FAMILY MEDICINE

## 2020-07-14 PROCEDURE — 99499 UNLISTED E&M SERVICE: CPT | Mod: S$GLB,,, | Performed by: FAMILY MEDICINE

## 2020-07-14 PROCEDURE — 80305 POCT RAPID DRUG SCREEN 10 PANEL: ICD-10-PCS | Mod: QW,S$GLB,, | Performed by: FAMILY MEDICINE

## 2020-07-18 LAB
MEV IGG SER IA-ACNC: 76.9 AU/ML
MUV IGG SER IA-ACNC: 12.5 AU/ML
RUBV IGG SERPL IA-ACNC: 1.15 INDEX
SPECIMEN STATUS REPORT: NORMAL
VZV IGM SER IA-ACNC: <0.91 INDEX (ref 0–0.9)

## 2020-07-21 ENCOUNTER — OCCUPATIONAL HEALTH (OUTPATIENT)
Dept: URGENT CARE | Facility: CLINIC | Age: 25
End: 2020-07-21

## 2020-07-21 DIAGNOSIS — Z02.1 PRE-EMPLOYMENT EXAMINATION: Primary | ICD-10-CM

## 2020-07-21 PROCEDURE — 86580 TB INTRADERMAL TEST: CPT | Mod: S$GLB,,, | Performed by: FAMILY MEDICINE

## 2020-07-21 PROCEDURE — 86580 POCT TB SKIN TEST: ICD-10-PCS | Mod: S$GLB,,, | Performed by: FAMILY MEDICINE

## 2020-07-23 ENCOUNTER — OCCUPATIONAL HEALTH (OUTPATIENT)
Dept: URGENT CARE | Facility: CLINIC | Age: 25
End: 2020-07-23
Payer: MEDICAID

## 2020-07-23 DIAGNOSIS — Z20.1 EXPOSURE TO TB: Primary | ICD-10-CM

## 2020-07-23 PROCEDURE — 71045 X-RAY EXAM CHEST 1 VIEW: CPT | Mod: S$GLB,,, | Performed by: RADIOLOGY

## 2020-07-23 PROCEDURE — 71045 XR CHEST 1 VIEW: ICD-10-PCS | Mod: S$GLB,,, | Performed by: RADIOLOGY

## 2020-07-24 ENCOUNTER — TELEPHONE (OUTPATIENT)
Dept: DIABETES | Facility: CLINIC | Age: 25
End: 2020-07-24

## 2020-07-24 NOTE — TELEPHONE ENCOUNTER
Advise pt needs to establish care with diabetes management.       ----- Message from Michaelle Hammer sent at 7/24/2020 11:52 AM CDT -----  Contact: [facility]  Request a call concerning this pt, no additional info given and can be reached at [phone] [email or web address]

## 2020-09-06 PROBLEM — E87.5 HYPERKALEMIA: Status: ACTIVE | Noted: 2020-09-06

## 2020-09-06 PROBLEM — R00.0 SINUS TACHYCARDIA: Status: ACTIVE | Noted: 2020-09-06

## 2020-09-06 PROBLEM — R11.2 NON-INTRACTABLE VOMITING WITH NAUSEA: Status: ACTIVE | Noted: 2020-09-06

## 2020-09-06 PROBLEM — E86.9 VOLUME DEPLETION: Status: ACTIVE | Noted: 2020-09-06

## 2020-09-06 PROBLEM — E11.10 METABOLIC ACIDOSIS DUE TO DIABETES MELLITUS: Status: ACTIVE | Noted: 2020-09-06

## 2020-09-07 PROBLEM — E11.10 METABOLIC ACIDOSIS DUE TO DIABETES MELLITUS: Status: RESOLVED | Noted: 2020-09-06 | Resolved: 2020-09-07

## 2020-09-07 PROBLEM — R11.2 NON-INTRACTABLE VOMITING WITH NAUSEA: Status: RESOLVED | Noted: 2020-09-06 | Resolved: 2020-09-07

## 2020-12-30 PROBLEM — R00.2 PALPITATIONS: Status: ACTIVE | Noted: 2020-12-30

## 2020-12-30 PROBLEM — I48.91 ATRIAL FIBRILLATION WITH RVR: Status: ACTIVE | Noted: 2020-12-30

## 2020-12-31 PROBLEM — I47.10 SVT (SUPRAVENTRICULAR TACHYCARDIA): Status: ACTIVE | Noted: 2020-12-31

## 2021-01-13 PROBLEM — E83.42 HYPOMAGNESEMIA: Status: RESOLVED | Noted: 2018-05-29 | Resolved: 2021-01-13

## 2021-01-13 PROBLEM — R07.89 LEFT CHEST PRESSURE: Status: ACTIVE | Noted: 2021-01-13

## 2021-01-13 PROBLEM — R00.2 PALPITATIONS: Status: RESOLVED | Noted: 2020-12-30 | Resolved: 2021-01-13

## 2021-01-13 PROBLEM — E86.9 VOLUME DEPLETION: Status: RESOLVED | Noted: 2020-09-06 | Resolved: 2021-01-13

## 2021-01-13 PROBLEM — I51.89 DIASTOLIC DYSFUNCTION: Status: ACTIVE | Noted: 2021-01-13

## 2021-01-13 PROBLEM — R07.9 CHEST PAIN: Status: RESOLVED | Noted: 2020-06-04 | Resolved: 2021-01-13

## 2021-01-13 PROBLEM — E87.5 HYPERKALEMIA: Status: RESOLVED | Noted: 2020-09-06 | Resolved: 2021-01-13

## 2021-01-13 PROBLEM — R00.0 SINUS TACHYCARDIA: Status: RESOLVED | Noted: 2020-09-06 | Resolved: 2021-01-13

## 2021-01-13 PROBLEM — Z82.49 FAMILY HISTORY OF CORONARY ARTERY DISEASE: Status: ACTIVE | Noted: 2021-01-13

## 2021-05-10 ENCOUNTER — PATIENT MESSAGE (OUTPATIENT)
Dept: RESEARCH | Facility: HOSPITAL | Age: 26
End: 2021-05-10

## 2021-08-26 ENCOUNTER — IMMUNIZATION (OUTPATIENT)
Dept: FAMILY MEDICINE | Facility: CLINIC | Age: 26
End: 2021-08-26
Payer: MEDICAID

## 2021-08-26 DIAGNOSIS — Z23 NEED FOR VACCINATION: Primary | ICD-10-CM

## 2021-08-26 PROCEDURE — 0001A COVID-19, MRNA, LNP-S, PF, 30 MCG/0.3 ML DOSE VACCINE: CPT | Mod: CV19,,, | Performed by: FAMILY MEDICINE

## 2021-08-26 PROCEDURE — 91300 COVID-19, MRNA, LNP-S, PF, 30 MCG/0.3 ML DOSE VACCINE: CPT | Mod: ,,, | Performed by: FAMILY MEDICINE

## 2021-08-26 PROCEDURE — 0001A COVID-19, MRNA, LNP-S, PF, 30 MCG/0.3 ML DOSE VACCINE: ICD-10-PCS | Mod: CV19,,, | Performed by: FAMILY MEDICINE

## 2021-08-26 PROCEDURE — 91300 COVID-19, MRNA, LNP-S, PF, 30 MCG/0.3 ML DOSE VACCINE: ICD-10-PCS | Mod: ,,, | Performed by: FAMILY MEDICINE

## 2021-09-16 ENCOUNTER — IMMUNIZATION (OUTPATIENT)
Dept: FAMILY MEDICINE | Facility: CLINIC | Age: 26
End: 2021-09-16
Payer: MEDICAID

## 2021-09-16 DIAGNOSIS — Z23 NEED FOR VACCINATION: Primary | ICD-10-CM

## 2021-09-16 PROCEDURE — 91300 COVID-19, MRNA, LNP-S, PF, 30 MCG/0.3 ML DOSE VACCINE: ICD-10-PCS | Mod: ,,, | Performed by: FAMILY MEDICINE

## 2021-09-16 PROCEDURE — 91300 COVID-19, MRNA, LNP-S, PF, 30 MCG/0.3 ML DOSE VACCINE: CPT | Mod: ,,, | Performed by: FAMILY MEDICINE

## 2021-09-16 PROCEDURE — 0002A COVID-19, MRNA, LNP-S, PF, 30 MCG/0.3 ML DOSE VACCINE: ICD-10-PCS | Mod: CV19,,, | Performed by: FAMILY MEDICINE

## 2021-09-16 PROCEDURE — 0002A COVID-19, MRNA, LNP-S, PF, 30 MCG/0.3 ML DOSE VACCINE: CPT | Mod: CV19,,, | Performed by: FAMILY MEDICINE

## 2022-04-18 PROBLEM — K82.8 BILIARY DYSKINESIA: Status: ACTIVE | Noted: 2022-04-18

## 2023-08-23 ENCOUNTER — OCCUPATIONAL HEALTH (OUTPATIENT)
Dept: URGENT CARE | Facility: CLINIC | Age: 28
End: 2023-08-23

## 2023-08-23 DIAGNOSIS — Z00.00 ENCOUNTER FOR PHYSICAL EXAMINATION: Primary | ICD-10-CM

## 2023-08-23 LAB
CTP QC/QA: YES
POC 10 PANEL DRUG SCREEN: NEGATIVE

## 2023-08-23 PROCEDURE — 80305 POCT RAPID DRUG SCREEN 10 PANEL: ICD-10-PCS | Mod: S$GLB,,, | Performed by: NURSE PRACTITIONER

## 2023-08-23 PROCEDURE — 99499 PHYSICAL, BASIC COMPLEXITY: ICD-10-PCS | Mod: S$GLB,,, | Performed by: NURSE PRACTITIONER

## 2023-08-23 PROCEDURE — 80305 DRUG TEST PRSMV DIR OPT OBS: CPT | Mod: S$GLB,,, | Performed by: NURSE PRACTITIONER

## 2023-08-23 PROCEDURE — 99499 UNLISTED E&M SERVICE: CPT | Mod: S$GLB,,, | Performed by: NURSE PRACTITIONER

## 2024-04-18 ENCOUNTER — OCCUPATIONAL HEALTH (OUTPATIENT)
Dept: URGENT CARE | Facility: CLINIC | Age: 29
End: 2024-04-18

## 2024-04-18 DIAGNOSIS — Z02.89 ENCOUNTER FOR EXAMINATION REQUIRED BY DEPARTMENT OF TRANSPORTATION (DOT): Primary | ICD-10-CM

## 2024-04-18 PROCEDURE — 99499 UNLISTED E&M SERVICE: CPT | Mod: S$GLB,,, | Performed by: FAMILY MEDICINE

## 2024-05-23 PROBLEM — E11.10 DKA (DIABETIC KETOACIDOSIS): Status: ACTIVE | Noted: 2024-05-23

## 2024-05-23 PROBLEM — N17.9 AKI (ACUTE KIDNEY INJURY): Status: ACTIVE | Noted: 2024-05-23

## 2024-05-23 PROBLEM — I48.91 A-FIB: Chronic | Status: ACTIVE | Noted: 2024-05-23

## 2024-05-23 PROBLEM — E10.10 TYPE 1 DIABETES MELLITUS WITH KETOACIDOSIS WITHOUT COMA: Status: ACTIVE | Noted: 2024-05-23

## 2024-05-24 ENCOUNTER — TELEPHONE (OUTPATIENT)
Dept: ENDOCRINOLOGY | Facility: CLINIC | Age: 29
End: 2024-05-24
Payer: MEDICAID

## 2024-05-24 NOTE — TELEPHONE ENCOUNTER
----- Message from Monse Tee sent at 5/24/2024 12:43 PM CDT -----  Contact: self  Type: Sooner Appointment Request        Caller is requesting a sooner appointment. Caller declined first available appointment listed below. Caller will not accept being placed on the waitlist and is requesting a message be sent to doctor.        Name of Caller: [facility]   Best Call Back Number: [phone] (home)   Additional Information: Pt needs to gets scheduled [facility] will be sending a referral over to be seen in [facility] . Thanks

## 2024-05-25 PROBLEM — N17.9 AKI (ACUTE KIDNEY INJURY): Status: RESOLVED | Noted: 2024-05-23 | Resolved: 2024-05-25

## 2024-05-25 PROBLEM — E11.10 DKA (DIABETIC KETOACIDOSIS): Status: RESOLVED | Noted: 2024-05-23 | Resolved: 2024-05-25

## 2024-05-30 ENCOUNTER — OUTPATIENT CASE MANAGEMENT (OUTPATIENT)
Dept: ADMINISTRATIVE | Facility: OTHER | Age: 29
End: 2024-05-30
Payer: MEDICAID

## 2024-05-30 NOTE — PROGRESS NOTES
Outpatient Care Management  Patient Does Not Consent    Patient: Rubi Jose  MRN:  [id number]  Date of Service:  5/30/2024  Completed by:  Shiloh Rouse RN    Chief Complaint   Patient presents with    OPCM Enrollment Call    Case Closure       Patient Summary           Consent Received:  Decline  Decline Reason:  Not interested

## 2024-06-04 PROBLEM — E83.52 HYPERCALCEMIA: Status: ACTIVE | Noted: 2024-06-04

## 2024-06-04 PROBLEM — R13.10 ODYNOPHAGIA: Status: ACTIVE | Noted: 2024-06-04

## 2024-06-05 PROBLEM — E55.9 VITAMIN D DEFICIENCY: Status: ACTIVE | Noted: 2024-06-05

## 2024-06-06 PROBLEM — I10 PRIMARY HYPERTENSION: Status: ACTIVE | Noted: 2024-06-06

## 2024-06-07 PROBLEM — I48.0 PAROXYSMAL ATRIAL FIBRILLATION: Status: ACTIVE | Noted: 2024-05-23

## 2024-06-07 PROBLEM — I48.91 A-FIB: Status: ACTIVE | Noted: 2024-05-23

## 2024-07-19 ENCOUNTER — OFFICE VISIT (OUTPATIENT)
Dept: ENDOCRINOLOGY | Facility: CLINIC | Age: 29
End: 2024-07-19
Payer: COMMERCIAL

## 2024-07-19 ENCOUNTER — PATIENT MESSAGE (OUTPATIENT)
Dept: ENDOCRINOLOGY | Facility: CLINIC | Age: 29
End: 2024-07-19

## 2024-07-19 DIAGNOSIS — I10 HYPERTENSION, UNSPECIFIED TYPE: ICD-10-CM

## 2024-07-19 DIAGNOSIS — E10.42 TYPE 1 DIABETES MELLITUS WITH DIABETIC POLYNEUROPATHY: Primary | ICD-10-CM

## 2024-07-19 PROCEDURE — 99204 OFFICE O/P NEW MOD 45 MIN: CPT | Mod: 95,,, | Performed by: NURSE PRACTITIONER

## 2024-07-19 PROCEDURE — 3052F HG A1C>EQUAL 8.0%<EQUAL 9.0%: CPT | Mod: CPTII,95,, | Performed by: NURSE PRACTITIONER

## 2024-07-19 PROCEDURE — 4010F ACE/ARB THERAPY RXD/TAKEN: CPT | Mod: CPTII,95,, | Performed by: NURSE PRACTITIONER

## 2024-07-19 RX ORDER — INSULIN DEGLUDEC 100 U/ML
35 INJECTION, SOLUTION SUBCUTANEOUS DAILY
Qty: 15 ML | Refills: 6 | Status: SHIPPED | OUTPATIENT
Start: 2024-07-19

## 2024-07-19 NOTE — PROGRESS NOTES
Subjective     Patient ID: Rubi Jose is a 28 y.o. female.    Chief Complaint: Diabetes       HPI Pt is a 28 y.o. AAF  with a diagnosis of Type 1 diabetes mellitus diagnosed approximately 2013 at age 17 , as well as chronic conditions pending review including controlled afib, retinopathy, fecal incontinence HTN, .  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  .     Interim Events: July 19, 2024:  Pt is new to me having several recents admits for DKA once r/t UTI, once related to hypercalcemia as a result of taking TUMS for GERD.  Pt states has been unmotivated for a long time, but now finally doing much better. She is using a dexcom sensor.     Reports   -120,   Lunch a lot of 80s.   Supper 200, but may have had last lunch or snacked  -but usually close to supper.     Current DM meds:   Levemir 25/10 bid, lispro 10-10-10 plus ss 1:50        Failed DM meds:  jardiance DKA, Mounjaro--stopped in patient with DKA        Back up plan for insulin pump hiatus:   Statin: na        Not tolerated statin : na   ACE/ARB:na        Not tolerated ACE/ARB: na   Known Diabetic complications: retinropathy, neuropathy         Passwords for Tech Accounts:        Review of Systems   Constitutional:  Negative for activity change and fatigue.        There were no vitals taken for this visit.   HENT:  Negative for hearing loss and trouble swallowing.    Eyes:  Negative for photophobia and visual disturbance.        Last Eye Exam:  caratcts in April 2024   Respiratory:  Negative for cough and shortness of breath.    Cardiovascular:  Negative for chest pain and palpitations.   Gastrointestinal:  Positive for diarrhea and fecal incontinence. Negative for constipation.   Genitourinary:  Negative for frequency and urgency.   Musculoskeletal:  Negative for arthralgias and myalgias.   Integumentary:  Negative for rash and wound.   Neurological:   Negative for weakness and numbness.        Diaphoresis when eating, hot or cold foods    Psychiatric/Behavioral:  Negative for sleep disturbance. The patient is not nervous/anxious.           Objective     Physical Exam  Constitutional:       Appearance: Normal appearance. She is obese.   HENT:      Head: Normocephalic and atraumatic.      Nose: Nose normal.   Neurological:      General: No focal deficit present.      Mental Status: She is alert and oriented to person, place, and time.   Psychiatric:         Mood and Affect: Mood normal.         Behavior: Behavior normal.         Thought Content: Thought content normal.         Judgment: Judgment normal.         There were no vitals taken for this visit.    Hemoglobin A1C   Date Value Ref Range Status   05/23/2024 8.4 (H) 0.0 - 5.6 % Final     Comment:     Reference Interval:  5.0 - 5.6 Normal   5.7 - 6.4 High Risk   > 6.5 Diabetic      Hgb A1c results are standardized based on the (NGSP) National   Glycohemoglobin Standardization Program.      Hemoglobin A1C levels are related to mean serum/plasma glucose   during the preceding 2-3 months.        01/09/2023 9.8 (H) 0.0 - 5.6 % Final     Comment:     Reference Interval:  5.0 - 5.6 Normal   5.7 - 6.4 High Risk   > 6.5 Diabetic      Hgb A1c results are standardized based on the (NGSP) National   Glycohemoglobin Standardization Program.      Hemoglobin A1C levels are related to mean serum/plasma glucose   during the preceding 2-3 months.        04/18/2022 10.1 (H) 0.0 - 5.6 % Final     Comment:     Reference Interval:  5.0 - 5.6 Normal   5.7 - 6.4 High Risk   > 6.5 Diabetic      Hgb A1c results are standardized based on the (NGSP) National   Glycohemoglobin Standardization Program.      Hemoglobin A1C levels are related to mean serum/plasma glucose   during the preceding 2-3 months.            Chemistry        Component Value Date/Time     06/07/2024 0452    K 3.3 (L) 06/07/2024 0452     06/07/2024 0452     CO2 26 06/07/2024 0452    BUN 7 06/07/2024 0452    CREATININE 1.52 (H) 06/07/2024 0452     (H) 06/07/2024 0452        Component Value Date/Time    CALCIUM 8.4 06/07/2024 0452    ALKPHOS 105 06/04/2024 2016    AST 35 06/04/2024 2016    ALT 20 06/04/2024 2016    BILITOT 0.6 06/04/2024 2016    ESTGFRAFRICA >60 04/05/2022 0108    EGFRNONAA >60 04/05/2022 0108          Lab Results   Component Value Date    CHOL 133 02/25/2019     Lab Results   Component Value Date    HDL 55 02/25/2019     Lab Results   Component Value Date    LDLCALC 46.4 (L) 02/25/2019     Lab Results   Component Value Date    TRIG 158 (H) 02/25/2019     Lab Results   Component Value Date    CHOLHDL 41.4 02/25/2019     Lab Results   Component Value Date    MICALBCREAT 15.0 07/25/2016     Lab Results   Component Value Date    TSH 1.150 01/09/2023     Vit D, 25-Hydroxy   Date Value Ref Range Status   06/04/2024 <13 (L) 30 - 96 ng/mL Final     Comment:     Vitamin D deficiency.........<10 ng/mL                              Vitamin D insufficiency......10-29 ng/mL       Vitamin D sufficiency........> or equal to 30 ng/mL  Vitamin D toxicity............>100 ng/mL            Assessment and Plan   1. Type 1 diabetes mellitus with diabetic polyneuropathy  insulin degludec (TRESIBA FLEXTOUCH U-100) 100 unit/mL (3 mL) insulin pen      2. Hypertension, unspecified type              PLAN   D/c levemir  Start Tresiba 35 units daily  Decrease breakfast lispro from 10 to 8 units  (8-10-10) plus ss 1:50 starting at 150.      ORDERS 07/19/2024     7;30 am  Aug 20 with fasting cmp, lipids, A1c, tsh, urine m/c prior

## 2024-08-07 ENCOUNTER — TELEPHONE (OUTPATIENT)
Dept: ENDOCRINOLOGY | Facility: CLINIC | Age: 29
End: 2024-08-07
Payer: COMMERCIAL

## 2024-08-20 ENCOUNTER — OFFICE VISIT (OUTPATIENT)
Dept: ENDOCRINOLOGY | Facility: CLINIC | Age: 29
End: 2024-08-20
Payer: COMMERCIAL

## 2024-08-20 VITALS
WEIGHT: 203.38 LBS | DIASTOLIC BLOOD PRESSURE: 82 MMHG | HEIGHT: 68 IN | BODY MASS INDEX: 30.82 KG/M2 | SYSTOLIC BLOOD PRESSURE: 138 MMHG | HEART RATE: 93 BPM

## 2024-08-20 DIAGNOSIS — I10 HYPERTENSION, UNSPECIFIED TYPE: ICD-10-CM

## 2024-08-20 DIAGNOSIS — E10.42 TYPE 1 DIABETES MELLITUS WITH DIABETIC POLYNEUROPATHY: Primary | ICD-10-CM

## 2024-08-20 PROBLEM — N17.9 AKI (ACUTE KIDNEY INJURY): Status: RESOLVED | Noted: 2024-05-23 | Resolved: 2024-08-20

## 2024-08-20 PROCEDURE — 3075F SYST BP GE 130 - 139MM HG: CPT | Mod: CPTII,S$GLB,, | Performed by: NURSE PRACTITIONER

## 2024-08-20 PROCEDURE — 99999 PR PBB SHADOW E&M-EST. PATIENT-LVL IV: CPT | Mod: PBBFAC,,, | Performed by: NURSE PRACTITIONER

## 2024-08-20 PROCEDURE — 3052F HG A1C>EQUAL 8.0%<EQUAL 9.0%: CPT | Mod: CPTII,S$GLB,, | Performed by: NURSE PRACTITIONER

## 2024-08-20 PROCEDURE — 3079F DIAST BP 80-89 MM HG: CPT | Mod: CPTII,S$GLB,, | Performed by: NURSE PRACTITIONER

## 2024-08-20 PROCEDURE — 4010F ACE/ARB THERAPY RXD/TAKEN: CPT | Mod: CPTII,S$GLB,, | Performed by: NURSE PRACTITIONER

## 2024-08-20 PROCEDURE — 1159F MED LIST DOCD IN RCRD: CPT | Mod: CPTII,S$GLB,, | Performed by: NURSE PRACTITIONER

## 2024-08-20 PROCEDURE — 99214 OFFICE O/P EST MOD 30 MIN: CPT | Mod: S$GLB,,, | Performed by: NURSE PRACTITIONER

## 2024-08-20 PROCEDURE — 3008F BODY MASS INDEX DOCD: CPT | Mod: CPTII,S$GLB,, | Performed by: NURSE PRACTITIONER

## 2024-08-20 PROCEDURE — 95251 CONT GLUC MNTR ANALYSIS I&R: CPT | Mod: S$GLB,,, | Performed by: NURSE PRACTITIONER

## 2024-08-20 NOTE — PROGRESS NOTES
Subjective     Patient ID: Rubi Jose is a 28 y.o. female.    Chief Complaint: Diabetes       HPI Pt is a 28 y.o. AAF  with a diagnosis of Type 1 diabetes mellitus diagnosed approximately 2013 at age 17 , as well as chronic conditions pending review including controlled afib, retinopathy, fecal incontinence HTN, .  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  .     Interim Events: Aug 20, 2024:  No acute events or focal complaints. No actual hypoglycemia.  Sensor downloaded and reviewed. Issues with insurance regarding Tresiba--currently on Lantus with c.o wt gain (likely r/t improved glucoses). Currently taking 25 qhs, and lispro 8-10-10-. Sensor reviewed.  Note pt usually only eats 1 small yogurt for bkft--which she does not cover for as she would likely only need 1-2 units. Lunch is often chicken salad with club crackers.  And she states she does graze on club crackers. She was on a pump for many years until about 4 yrs ago, when pump became defective and provider left.      Current DM meds:   Lantus 25 qhs,  lispro 8-10 -10 plus ss 1:50        Failed DM meds:  jardiance DKA, Mounjaro--stopped in patient with DKA        Back up plan for insulin pump hiatus:   Statin: na        Not tolerated statin : na   ACE/ARB:na        Not tolerated ACE/ARB: na   Known Diabetic complications: retinropathy, neuropathy         Passwords for Tech Accounts:      CGMS Interpretation:           Sensor/Pump Interpretation or Prominent Theme: Glucoses are often becoming very tight at 3-4 am with glucoses hovering 60/70 range.  Note increase in AM, r/t yogurt only and no insulin. Patterns during day more erratic with couple of near hypoglycemia events likely r/t ISF being off or carb to insulin mismatch.      July 19, 2024:  Pt is new to me having several recents admits for DKA once r/t UTI, once related to hypercalcemia as a result of taking TUMS  for GERD.  Pt states has been unmotivated for a long time, but now finally doing much better. She is using a dexcom sensor.     Reports   -120,   Lunch a lot of 80s.   Supper 200, but may have had last lunch or snacked  -but usually close to supper.         Review of Systems   Constitutional:  Negative for activity change and fatigue.   HENT:  Negative for hearing loss and trouble swallowing.    Eyes:  Negative for photophobia and visual disturbance.        Last Eye Exam:  caratcts in April 2024   Respiratory:  Negative for cough and shortness of breath.    Cardiovascular:  Negative for chest pain and palpitations.   Gastrointestinal:  Positive for diarrhea and fecal incontinence. Negative for constipation.   Genitourinary:  Negative for frequency and urgency.   Musculoskeletal:  Negative for arthralgias and myalgias.   Integumentary:  Negative for rash and wound.   Neurological:  Negative for weakness and numbness.        Diaphoresis when eating, hot or cold foods    Psychiatric/Behavioral:  Negative for sleep disturbance. The patient is not nervous/anxious.           Objective     Physical Exam  Constitutional:       Appearance: Normal appearance. She is obese.   HENT:      Head: Normocephalic and atraumatic.      Nose: Nose normal.      Mouth/Throat:      Mouth: Mucous membranes are moist.      Pharynx: Oropharynx is clear.   Eyes:      Extraocular Movements: Extraocular movements intact.      Conjunctiva/sclera: Conjunctivae normal.      Pupils: Pupils are equal, round, and reactive to light.   Neck:      Vascular: No carotid bruit.   Cardiovascular:      Rate and Rhythm: Normal rate and regular rhythm.      Pulses: Normal pulses.      Heart sounds: Normal heart sounds.   Pulmonary:      Effort: Pulmonary effort is normal.      Breath sounds: Normal breath sounds.   Musculoskeletal:         General: Normal range of motion.      Cervical back: Normal range of motion and neck supple.      Right lower  "leg: No edema.      Left lower leg: No edema.   Skin:     General: Skin is warm and dry.   Neurological:      General: No focal deficit present.      Mental Status: She is alert and oriented to person, place, and time.   Psychiatric:         Mood and Affect: Mood normal.         Behavior: Behavior normal.         Thought Content: Thought content normal.         Judgment: Judgment normal.           /82 (BP Location: Right arm, Patient Position: Sitting, BP Method: Large (Manual))   Pulse 93   Ht 5' 8" (1.727 m)   Wt 92.3 kg (203 lb 6 oz)   BMI 30.92 kg/m²     Hemoglobin A1C   Date Value Ref Range Status   05/23/2024 8.4 (H) 0.0 - 5.6 % Final     Comment:     Reference Interval:  5.0 - 5.6 Normal   5.7 - 6.4 High Risk   > 6.5 Diabetic      Hgb A1c results are standardized based on the (NGSP) National   Glycohemoglobin Standardization Program.      Hemoglobin A1C levels are related to mean serum/plasma glucose   during the preceding 2-3 months.        01/09/2023 9.8 (H) 0.0 - 5.6 % Final     Comment:     Reference Interval:  5.0 - 5.6 Normal   5.7 - 6.4 High Risk   > 6.5 Diabetic      Hgb A1c results are standardized based on the (NGSP) National   Glycohemoglobin Standardization Program.      Hemoglobin A1C levels are related to mean serum/plasma glucose   during the preceding 2-3 months.        04/18/2022 10.1 (H) 0.0 - 5.6 % Final     Comment:     Reference Interval:  5.0 - 5.6 Normal   5.7 - 6.4 High Risk   > 6.5 Diabetic      Hgb A1c results are standardized based on the (NGSP) National   Glycohemoglobin Standardization Program.      Hemoglobin A1C levels are related to mean serum/plasma glucose   during the preceding 2-3 months.            Chemistry        Component Value Date/Time     06/07/2024 0452    K 3.3 (L) 06/07/2024 0452     06/07/2024 0452    CO2 26 06/07/2024 0452    BUN 7 06/07/2024 0452    CREATININE 1.52 (H) 06/07/2024 0452     (H) 06/07/2024 0452        Component Value " Date/Time    CALCIUM 8.4 06/07/2024 0452    ALKPHOS 105 06/04/2024 2016    AST 35 06/04/2024 2016    ALT 20 06/04/2024 2016    BILITOT 0.6 06/04/2024 2016    ESTGFRAFRICA >60 04/05/2022 0108    EGFRNONAA >60 04/05/2022 0108          Lab Results   Component Value Date    CHOL 133 02/25/2019     Lab Results   Component Value Date    HDL 55 02/25/2019     Lab Results   Component Value Date    LDLCALC 46.4 (L) 02/25/2019     Lab Results   Component Value Date    TRIG 158 (H) 02/25/2019     Lab Results   Component Value Date    CHOLHDL 41.4 02/25/2019     Lab Results   Component Value Date    MICALBCREAT 15.0 07/25/2016     Lab Results   Component Value Date    TSH 1.150 01/09/2023     Vit D, 25-Hydroxy   Date Value Ref Range Status   06/04/2024 <13 (L) 30 - 96 ng/mL Final     Comment:     Vitamin D deficiency.........<10 ng/mL                              Vitamin D insufficiency......10-29 ng/mL       Vitamin D sufficiency........> or equal to 30 ng/mL  Vitamin D toxicity............>100 ng/mL                Assessment and Plan   1. Type 1 diabetes mellitus with diabetic polyneuropathy  Ambulatory referral/consult to Diabetes Education      2. Hypertension, unspecified type                  PLAN   Decrease Lantus to 22 units.   Cont lispor 8-10-10.     Regarding insulin pump--Pt choice--but would prefere Medtronic 780.   Rate 0.7 u/hr ATC  ISF 1:50  CHO 1:13.     ORDERS 08/20/2024   Cons Diabetes ED--PUmp evaluation.      3 mo --no  labs

## 2024-09-13 ENCOUNTER — OFFICE VISIT (OUTPATIENT)
Dept: URGENT CARE | Facility: CLINIC | Age: 29
End: 2024-09-13
Payer: COMMERCIAL

## 2024-09-13 VITALS
WEIGHT: 203 LBS | HEIGHT: 68 IN | BODY MASS INDEX: 30.77 KG/M2 | TEMPERATURE: 98 F | DIASTOLIC BLOOD PRESSURE: 97 MMHG | SYSTOLIC BLOOD PRESSURE: 135 MMHG | OXYGEN SATURATION: 97 % | HEART RATE: 87 BPM | RESPIRATION RATE: 18 BRPM

## 2024-09-13 DIAGNOSIS — Z20.828 EXPOSURE TO THE FLU: ICD-10-CM

## 2024-09-13 DIAGNOSIS — R11.10 VOMITING, UNSPECIFIED VOMITING TYPE, UNSPECIFIED WHETHER NAUSEA PRESENT: Primary | ICD-10-CM

## 2024-09-13 LAB
CTP QC/QA: YES
POC MOLECULAR INFLUENZA A AGN: NEGATIVE
POC MOLECULAR INFLUENZA B AGN: NEGATIVE

## 2024-09-13 RX ORDER — ONDANSETRON 4 MG/1
4 TABLET, ORALLY DISINTEGRATING ORAL EVERY 8 HOURS PRN
Qty: 30 TABLET | Refills: 0 | Status: SHIPPED | OUTPATIENT
Start: 2024-09-13

## 2024-09-13 RX ORDER — BALOXAVIR MARBOXIL 80 MG/1
80 TABLET, FILM COATED ORAL ONCE
Qty: 1 TABLET | Refills: 0 | Status: SHIPPED | OUTPATIENT
Start: 2024-09-13 | End: 2024-09-13

## 2024-09-13 NOTE — LETTER
September 13, 2024      [facility]  [address], [address]  [facility] [address]  Phone: [phone]  Fax: [phone]       Patient: Rubi Jose   YOB: 1995  Date of Visit: 09/13/2024    To Whom It May Concern:    Reilly Jose  was at [facility] on 09/13/2024. She may return to work/school on 9/16/24 with no restrictions. If you have any questions or concerns, or if I can be of further assistance, please do not hesitate to contact me.    Sincerely,     HAKEEM Santoyo

## 2024-09-13 NOTE — PROGRESS NOTES
"Subjective:      Patient ID: Rubi Jose is a 28 y.o. female.    Vitals:  height is 5' 8" (1.727 m) and weight is 92.1 kg (203 lb). Her oral temperature is 98.3 °F (36.8 °C). Her blood pressure is 135/97 (abnormal) and her pulse is 87. Her respiration is 18 and oxygen saturation is 97%.     Chief Complaint: Emesis    Pt presents with c/o vomiting, fatigue Onset- Today. Pt states has not taken any OTC medications. Pain score 0/10  Pt states exposed to Flu    Emesis   This is a new problem. The current episode started today. The problem occurs 2 to 4 times per day. The problem has been unchanged. The emesis has an appearance of bile. There has been no fever. The fever has been present for Less than 1 day. Pertinent negatives include no abdominal pain, arthralgias, chest pain, chills, coughing, decreased urine volume, diarrhea, dizziness, fever, headaches, myalgias, sweats, URI or weight loss. She has tried nothing for the symptoms. The treatment provided no relief.       Constitution: Positive for fatigue. Negative for chills, sweating and fever.   HENT:  Negative for ear pain, drooling, congestion, postnasal drip, sore throat, trouble swallowing and voice change.    Neck: Negative for neck pain, neck stiffness, painful lymph nodes and neck swelling.   Cardiovascular:  Negative for chest pain, leg swelling, palpitations, sob on exertion and passing out.   Eyes:  Negative for eye pain, eye redness, photophobia, double vision, blurred vision and eyelid swelling.   Respiratory:  Negative for chest tightness, cough, sputum production, bloody sputum, shortness of breath, stridor and wheezing.    Gastrointestinal:  Positive for nausea and vomiting. Negative for abdominal pain, abdominal bloating, constipation, diarrhea and heartburn.   Genitourinary:  Negative for urine decreased.   Musculoskeletal:  Negative for joint pain, joint swelling, abnormal ROM of joint, back pain, muscle cramps and muscle ache. "   Skin:  Negative for rash and hives.   Allergic/Immunologic: Negative for seasonal allergies, food allergies, hives, itching and sneezing.   Neurological:  Negative for dizziness, light-headedness, passing out, loss of balance, headaches, altered mental status, loss of consciousness and seizures.   Hematologic/Lymphatic: Negative for swollen lymph nodes.   Psychiatric/Behavioral:  Negative for altered mental status and nervous/anxious. The patient is not nervous/anxious.       Objective:     Physical Exam   Constitutional: She is oriented to person, place, and time. She appears well-developed. She is cooperative.  Non-toxic appearance. She does not appear ill. No distress.   HENT:   Head: Normocephalic and atraumatic.   Ears:   Right Ear: Hearing, tympanic membrane, external ear and ear canal normal.   Left Ear: Hearing, tympanic membrane, external ear and ear canal normal.   Nose: Mucosal edema and rhinorrhea present. No nasal deformity. No epistaxis. Right sinus exhibits no maxillary sinus tenderness and no frontal sinus tenderness. Left sinus exhibits no maxillary sinus tenderness and no frontal sinus tenderness.   Mouth/Throat: Uvula is midline and mucous membranes are normal. No trismus in the jaw. Normal dentition. No uvula swelling. Posterior oropharyngeal erythema and cobblestoning present. No oropharyngeal exudate, posterior oropharyngeal edema or tonsillar abscesses. No tonsillar exudate.   Eyes: Conjunctivae and lids are normal. No scleral icterus.   Neck: Trachea normal and phonation normal. Neck supple. No edema present. No erythema present. No neck rigidity present.   Cardiovascular: Normal rate, regular rhythm, normal heart sounds and normal pulses.   Pulmonary/Chest: Effort normal and breath sounds normal. No accessory muscle usage or stridor. No respiratory distress. She has no decreased breath sounds. She has no wheezes. She has no rhonchi. She has no rales.   Abdominal: Normal appearance.    Musculoskeletal: Normal range of motion.         General: No deformity or edema. Normal range of motion.   Lymphadenopathy:     She has no cervical adenopathy.   Neurological: She is alert and oriented to person, place, and time. She exhibits normal muscle tone. Coordination normal.   Skin: Skin is warm, dry, intact, not diaphoretic, not pale and no rash. Capillary refill takes less than 2 seconds.   Psychiatric: Her speech is normal and behavior is normal. Judgment and thought content normal.   Nursing note and vitals reviewed.      Assessment:     1. Vomiting, unspecified vomiting type, unspecified whether nausea present    2. Exposure to the flu        Plan:       Vomiting, unspecified vomiting type, unspecified whether nausea present  -     POCT Influenza A/B MOLECULAR    Exposure to the flu    Other orders  -     ondansetron (ZOFRAN-ODT) 4 MG TbDL; Take 1 tablet (4 mg total) by mouth every 8 (eight) hours as needed (nausea).  Dispense: 30 tablet; Refill: 0  -     baloxavir marboxiL (XOFLUZA) 80 mg tablet; Take 1 tablet (80 mg total) by mouth once. for 1 dose  Dispense: 1 tablet; Refill: 0      Patient Instructions   INSTRUCTIONS:  - Rest.  - Drink plenty of fluids.  - Take Tylenol and/or Ibuprofen as directed as needed for fever/pain.  Do not take more than the recommended dose.  - follow up with your PCP within the next 1-2 weeks as needed.  - You must understand that you have received an Urgent Care treatment only and that you may be released before all of your medical problems are known or treated.   - You, the patient, will arrange for follow up care as instructed.   - If your condition worsens or fails to improve we recommend that you receive another evaluation at the ER immediately or contact your PCP to discuss your concerns.   - You can call [phone] or [phone] to help schedule an appointment with the appropriate provider.     -If you smoke cigarettes, it would be beneficial for you to  stop.

## 2024-09-13 NOTE — PATIENT INSTRUCTIONS

## 2024-10-13 ENCOUNTER — OFFICE VISIT (OUTPATIENT)
Dept: URGENT CARE | Facility: CLINIC | Age: 29
End: 2024-10-13
Payer: COMMERCIAL

## 2024-10-13 VITALS
HEIGHT: 68 IN | BODY MASS INDEX: 30.77 KG/M2 | DIASTOLIC BLOOD PRESSURE: 95 MMHG | OXYGEN SATURATION: 98 % | TEMPERATURE: 99 F | WEIGHT: 203 LBS | SYSTOLIC BLOOD PRESSURE: 133 MMHG | HEART RATE: 91 BPM

## 2024-10-13 DIAGNOSIS — M79.641 RIGHT HAND PAIN: Primary | ICD-10-CM

## 2024-10-13 PROCEDURE — 99213 OFFICE O/P EST LOW 20 MIN: CPT | Mod: 25,S$GLB,, | Performed by: NURSE PRACTITIONER

## 2024-10-13 PROCEDURE — 96372 THER/PROPH/DIAG INJ SC/IM: CPT | Mod: S$GLB,,, | Performed by: NURSE PRACTITIONER

## 2024-10-13 RX ORDER — PREDNISONE 10 MG/1
10 TABLET ORAL DAILY
Qty: 5 TABLET | Refills: 0 | Status: SHIPPED | OUTPATIENT
Start: 2024-10-13 | End: 2024-10-18

## 2024-10-13 RX ORDER — KETOROLAC TROMETHAMINE 30 MG/ML
30 INJECTION, SOLUTION INTRAMUSCULAR; INTRAVENOUS ONCE
Status: COMPLETED | OUTPATIENT
Start: 2024-10-13 | End: 2024-10-13

## 2024-10-13 RX ORDER — METHOCARBAMOL 500 MG/1
500 TABLET, FILM COATED ORAL 4 TIMES DAILY
Qty: 40 TABLET | Refills: 0 | Status: SHIPPED | OUTPATIENT
Start: 2024-10-13 | End: 2024-10-23

## 2024-10-13 RX ORDER — MELOXICAM 7.5 MG/1
TABLET ORAL
Qty: 30 TABLET | Refills: 0 | Status: SHIPPED | OUTPATIENT
Start: 2024-10-13

## 2024-10-13 RX ADMIN — KETOROLAC TROMETHAMINE 30 MG: 30 INJECTION, SOLUTION INTRAMUSCULAR; INTRAVENOUS at 05:10

## 2024-10-13 NOTE — PROGRESS NOTES
"Subjective:      Patient ID: Rubi Jose is a 28 y.o. female.    Vitals:  height is 5' 8" (1.727 m) and weight is 92.1 kg (203 lb). Her oral temperature is 98.7 °F (37.1 °C). Her blood pressure is 133/95 (abnormal) and her pulse is 91. Her oxygen saturation is 98%.     Chief Complaint: Arm Pain    Pt presents today with c/o right arm pain. Pt has taken otc meds for symptoms with no relief. Pt has no known injury mechanism. Pain level 9/10.    Pt states that she has a pinched nerve in the arm    Arm Pain   Her dominant hand is their right hand. There was no injury mechanism. The pain is present in the right hand, right wrist and right shoulder. Quality: sharp. The pain does not radiate. The pain is at a severity of 9/10. The pain is severe. The pain has been Constant since the incident. Associated symptoms include numbness. Pertinent negatives include no chest pain, muscle weakness or tingling. The symptoms are aggravated by lifting, palpation and movement. She has tried NSAIDs and acetaminophen for the symptoms. The treatment provided no relief.       Cardiovascular:  Negative for chest pain.   Neurological:  Positive for numbness.      Objective:     Physical Exam   Constitutional: She is oriented to person, place, and time. She appears well-developed. She is cooperative.  Non-toxic appearance. She does not appear ill. No distress.   HENT:   Head: Normocephalic and atraumatic.   Ears:   Right Ear: Hearing, tympanic membrane, external ear and ear canal normal.   Left Ear: Hearing, tympanic membrane, external ear and ear canal normal.   Nose: Nose normal. No mucosal edema, rhinorrhea or nasal deformity. No epistaxis. Right sinus exhibits no maxillary sinus tenderness and no frontal sinus tenderness. Left sinus exhibits no maxillary sinus tenderness and no frontal sinus tenderness.   Mouth/Throat: Uvula is midline, oropharynx is clear and moist and mucous membranes are normal. No trismus in the jaw. " Normal dentition. No uvula swelling. No oropharyngeal exudate, posterior oropharyngeal edema or posterior oropharyngeal erythema.   Eyes: Conjunctivae and lids are normal.   Neck: Trachea normal and phonation normal. Neck supple. No edema present. No erythema present.   Cardiovascular: Normal rate, regular rhythm, normal heart sounds and normal pulses.   Pulmonary/Chest: Effort normal and breath sounds normal. She has no decreased breath sounds.   Abdominal: Normal appearance.   Musculoskeletal: Normal range of motion.         General: No deformity. Normal range of motion.      Right wrist: She exhibits swelling.      Comments: Slight swelling at the dorsal wrist. Able to Move without observable complications. +phalens +tinels    Neurological: She is alert and oriented to person, place, and time. She exhibits normal muscle tone. Coordination normal.   Skin: Skin is warm, dry, intact, not diaphoretic and not pale.   Psychiatric: Her speech is normal and behavior is normal. Judgment and thought content normal.   Nursing note and vitals reviewed.      Assessment:     1. Right hand pain        Plan:       Right hand pain  -     ketorolac injection 30 mg  -     predniSONE (DELTASONE) 10 MG tablet; Take 1 tablet (10 mg total) by mouth once daily. for 5 days  Dispense: 5 tablet; Refill: 0  -     methocarbamoL (ROBAXIN) 500 MG Tab; Take 1 tablet (500 mg total) by mouth 4 (four) times daily. for 10 days  Dispense: 40 tablet; Refill: 0  -     meloxicam (MOBIC) 7.5 MG tablet; 7.5mg, 1-2 times a day  Dispense: 30 tablet; Refill: 0      Keep appt with Neuro on 10/18.  Start Mobic arnol 10/14/2024  Wear wrist brace.  ED for any further evaluation

## 2025-01-09 PROBLEM — K82.8 BILIARY DYSKINESIA: Status: RESOLVED | Noted: 2022-04-18 | Resolved: 2025-01-09

## 2025-01-09 PROBLEM — Z82.49 FAMILY HISTORY OF CORONARY ARTERY DISEASE: Status: RESOLVED | Noted: 2021-01-13 | Resolved: 2025-01-09

## 2025-01-09 PROBLEM — E66.9 OBESITY (BMI 30-39.9): Status: ACTIVE | Noted: 2025-01-09

## 2025-01-09 PROBLEM — E10.3291 MILD NONPROLIFERATIVE DIABETIC RETINOPATHY OF RIGHT EYE WITHOUT MACULAR EDEMA ASSOCIATED WITH TYPE 1 DIABETES MELLITUS: Status: RESOLVED | Noted: 2019-02-18 | Resolved: 2025-01-09

## 2025-01-09 PROBLEM — R07.89 LEFT CHEST PRESSURE: Status: RESOLVED | Noted: 2021-01-13 | Resolved: 2025-01-09

## 2025-01-09 PROBLEM — R13.10 ODYNOPHAGIA: Status: RESOLVED | Noted: 2024-06-04 | Resolved: 2025-01-09

## 2025-01-09 PROBLEM — I51.89 DIASTOLIC DYSFUNCTION: Status: RESOLVED | Noted: 2021-01-13 | Resolved: 2025-01-09

## 2025-01-09 PROBLEM — R15.9 FECAL INCONTINENCE: Status: RESOLVED | Noted: 2018-05-28 | Resolved: 2025-01-09

## 2025-01-09 PROBLEM — E55.9 VITAMIN D DEFICIENCY: Status: RESOLVED | Noted: 2024-06-05 | Resolved: 2025-01-09

## 2025-01-09 PROBLEM — R53.1 WEAKNESS: Status: RESOLVED | Noted: 2020-02-24 | Resolved: 2025-01-09

## 2025-01-09 PROBLEM — E10.10 DIABETIC KETOACIDOSIS WITHOUT COMA ASSOCIATED WITH TYPE 1 DIABETES MELLITUS: Status: ACTIVE | Noted: 2025-01-09

## 2025-01-09 PROBLEM — I47.10 SVT (SUPRAVENTRICULAR TACHYCARDIA): Status: RESOLVED | Noted: 2020-12-31 | Resolved: 2025-01-09

## 2025-01-09 PROBLEM — I48.91 ATRIAL FIBRILLATION WITH RVR: Status: RESOLVED | Noted: 2020-12-30 | Resolved: 2025-01-09

## 2025-01-09 PROBLEM — E83.52 HYPERCALCEMIA: Status: RESOLVED | Noted: 2024-06-04 | Resolved: 2025-01-09

## 2025-01-10 PROBLEM — B34.2 CORONAVIRUS INFECTION: Status: ACTIVE | Noted: 2025-01-10

## 2025-01-11 PROBLEM — E10.10 DIABETIC KETOACIDOSIS WITHOUT COMA ASSOCIATED WITH TYPE 1 DIABETES MELLITUS: Status: RESOLVED | Noted: 2025-01-09 | Resolved: 2025-01-11

## 2025-01-11 PROBLEM — R11.2 NAUSEA AND VOMITING: Status: RESOLVED | Noted: 2020-09-06 | Resolved: 2025-01-11

## 2025-01-11 PROBLEM — B34.2 CORONAVIRUS INFECTION: Status: RESOLVED | Noted: 2025-01-10 | Resolved: 2025-01-11

## 2025-01-16 ENCOUNTER — OUTPATIENT CASE MANAGEMENT (OUTPATIENT)
Dept: ADMINISTRATIVE | Facility: OTHER | Age: 30
End: 2025-01-16
Payer: COMMERCIAL

## 2025-01-16 ENCOUNTER — PATIENT MESSAGE (OUTPATIENT)
Dept: ADMINISTRATIVE | Facility: OTHER | Age: 30
End: 2025-01-16
Payer: COMMERCIAL

## 2025-01-16 NOTE — LETTER
January 16, 2025             Dear Rubi,    Welcome to [facility].  It was a pleasure talking with you today.  My name is Odette MANUEL SanchezMartell, and I look forward to being your Care Manager.  My goal is to help you function at the healthiest and highest level possible.  You can contact me directly at [phone].    As an [facility] patient, some of the services we may be able to provide include:     Development of an individualized care plan with a Registered Nurse   Connection with a   Connection with available resources and services    Coordinate communication among your care team members   Provide coaching and education   Help you understand your doctors treatment plan  Help you obtain information about your insurance coverage.     All services provided by [facility] Complex [facility] Managers and other care team members are coordinated with and communicated to your primary care team.      As part of your enrollment, you will be receiving education materials and more information about these services in your [facility] account, by phone or through the mail.  If you do not wish to participate or receive information, please contact our office at [phone].      Sincerely,        Odette Martell, RN  [facility]   Out-patient RN Complex Care Manager

## 2025-01-16 NOTE — PROGRESS NOTES
Outpatient Care Management  Initial Patient Assessment    Patient: Rubi Jose  MRN: [id number]  Date of Service: 01/16/2025  Completed by: Odette Martell RN  Referral Date: 01/11/2025  Date of Eligibility: 1/11/2025  Program:   High Risk  Status: Ongoing  Effective Dates: 1/16/2025 - present  Responsible Staff: No information to display        Reason for Visit   Patient presents with    OPCM Enrollment Call    Nursing Assessment       Brief Summary:  Rubi Jose was referred by Dr CORY Fuentes for diabetic ketoacidosis. Patient qualifies for program based on risk score of 63%.   Active problem list, medical, surgical and social history reviewed. Active comorbidities include hx of fani cataract extractions, HTN, a fib, T1DM with current A1c of 11.4 and obesity. Areas of need identified by patient include assistance with affording Dexcom CGM supplies and Lantus.   Next steps: Send educational literature to her pt portal account about diabetes and HTN and plan to discuss both disease processes and importance of managing both.   Refer to the OPC LCSW to assist with applying for Medicaid and exploring any other alternatives for med and diabetic testing supplies assistance. Odette Martell RN    Disability Status  Is the patient alert and oriented (person, place, time, and situation)?: Alert and oriented x 4  Hearing Difficulty or Deaf: no  Visual Difficulty or Blind: yes  Visual and Hearing Needs Conclusion: -- (Has had fani cataract extractions. Vision is improved since surgeries.)  Vision Management: -- (No issues since cataract extractions.)  Difficulty Concentrating, Remembering or Making Decisions: no  Communication Difficulty: no  Eating/Swallowing Difficulty: no  Walking or Climbing Stairs Difficulty: no  Dressing/Bathing Difficulty: no  Toileting : Independent  Continence : Continence - Not a problem  Difficulty Managing Errands Independently: no  Equipment Currently Used at Home:  blood pressure machine  ADL Conclusion Statement: -- (Independent with all ADL's and iADL's.)  Change in Functional Status Since Onset of Current Illness/Injury: no        Spiritual Beliefs  Spiritual, Cultural Beliefs, Yazidism Practices, Values that Affect Care: no      Social History     Socioeconomic History    Marital status: Single   Occupational History    Occupation: Student at [employer]   Tobacco Use    Smoking status: Every Day     Current packs/day: 0.25     Types: Cigarettes, Cigars    Smokeless tobacco: Never    Tobacco comments:     Black and mild on occasion   Substance and Sexual Activity    Alcohol use: No     Comment: very rare    Sexual activity: Yes     Partners: Female     Birth control/protection: None     Social Drivers of Health     Financial Resource Strain: Low Risk  (1/10/2025)    Overall Financial Resource Strain (CARDIA)     Difficulty of Paying Living Expenses: Not hard at all   Food Insecurity: No Food Insecurity (1/10/2025)    Hunger Vital Sign     Worried About Running Out of Food in the Last Year: Never true     Ran Out of Food in the Last Year: Never true   Transportation Needs: No Transportation Needs (1/10/2025)    TRANSPORTATION NEEDS     Transportation : No   Physical Activity: Inactive (1/10/2025)    Exercise Vital Sign     Days of Exercise per Week: 0 days     Minutes of Exercise per Session: 0 min   Stress: Stress Concern Present (1/10/2025)    [employer] [place] [employer] - Occupational Stress Questionnaire     Feeling of Stress : To some extent   Housing Stability: Low Risk  (1/10/2025)    Housing Stability Vital Sign     Unable to Pay for Housing in the Last Year: No     Homeless in the Last Year: No       Roles and Relationships  Primary Source of Support/Comfort: significant other  Name of Support/Comfort Primary Source: -- (Tyson)  Secondary Source of Support/Comfort: sibling(s)  Name of Support/Comfort Secondary Source: -- (Annabelle and  Emily)      Advance Directives (For Healthcare)  Advance Directive  (If Adv Dir status is received, view document under Adv Dir in header or Chart Review Media tab): Patient does not have Advance Directive, requests information.        Patient Reported Insurance  Verified current insurance plan:: Other (see comment) ([facility])            1/16/2025     1:04 PM   Depression Patient Health Questionnaire   Over the last two weeks how often have you been bothered by little interest or pleasure in doing things Not at all   Over the last two weeks how often have you been bothered by feeling down, depressed or hopeless Not at all   PHQ-2 Total Score 0       Learning Assessment       01/16/2025 1428 [facility] (1/16/2025 - Present)   Created by Odette Martell, RN - RN (Nurse) Status: Complete                 PRIMARY LEARNER     Primary Learner Name:  Rubi  - 01/16/2025 1428    Relationship:  Patient  - 01/16/2025 1428    Does the primary learner have any barriers to learning?:  No Barriers  - 01/16/2025 1428    What is the preferred language of the primary learner?:  English  - 01/16/2025 1428    Is an  required?:  No  - 01/16/2025 1428    How does the primary learner prefer to learn new concepts?:  Listening, Reading  - 01/16/2025 1428    How often do you need to have someone help you read instructions, pamphlets, or written material from your doctor or pharmacy?:  Never  - 01/16/2025 1428        CO-LEARNER #1     No question answered        CO-LEARNER #2     No question answered        SPECIAL TOPICS     No question answered        ANSWERED BY:     No question answered        Comments         Edit History       Odette Martell, RN - RN (Nurse)   01/16/2025 1428

## 2025-01-17 ENCOUNTER — OUTPATIENT CASE MANAGEMENT (OUTPATIENT)
Dept: ADMINISTRATIVE | Facility: OTHER | Age: 30
End: 2025-01-17
Payer: COMMERCIAL

## 2025-01-17 NOTE — PROGRESS NOTES
Outpatient Care Management   - Patient Assessment    Patient: Rubi Jose  MRN:  [id number]  Date of Service:  1/17/2025  Completed by:  Sugar Ward LMSW  Referral Date: 01/11/2025    Reason for Visit   Patient presents with    Social Work Assessment     01/17/2025         Brief Summary:  received a referral from OPCM RN Odette Martell RN for the following HR SW psychosocial needs Dexcom assistance and medicaid .  The patient also requests assistance with [facility] financial [facility]. \[facility] [place] [facility]\"" CM JOSH completed assessment with pt. Pt is currently on unpaid leave with her job but will return in March. Pt denies any issues affording food and other  house hold bills. Patient is independent with all ADLs and drives self. Pt identified significant other who would assist with Caregiving needs if ever needed. Reports needing assistance with medical bills and her diabetes supplies. Pt was denied medicaid previously but report she is going to reapply due to change in income.Educated patient on [facility] financial assistance and Pharmacy assistance program. SW offered to provide informational hand out on Dexcom financial assistance. Pt would like to receive this info via email. Care plan was created in collaboration with patient/caregiver input.   completed the SDOH questionnaire.

## 2025-01-22 ENCOUNTER — OUTPATIENT CASE MANAGEMENT (OUTPATIENT)
Dept: ADMINISTRATIVE | Facility: OTHER | Age: 30
End: 2025-01-22
Payer: COMMERCIAL

## 2025-01-22 ENCOUNTER — TELEPHONE (OUTPATIENT)
Dept: PHARMACY | Facility: CLINIC | Age: 30
End: 2025-01-22
Payer: COMMERCIAL

## 2025-01-22 NOTE — TELEPHONE ENCOUNTER
----- Message from Sugar Ward sent at 1/22/2025 11:25 AM CST -----  Good Morning,    Patient is interested in any assistance she can receive with her prescriptions, due to recent significant income change.     Thanks  Sugar Ward,NIKKY  OPCM SW

## 2025-01-22 NOTE — TELEPHONE ENCOUNTER
We have reviewed Ms. Jose current medication list and/or insurance status. Unfortunately, The Pharmacy Patient Assistance Team is unable to assist at this time due to the following reasons      Patient has Private/Commercial Insurance        Ms. Jose my qualify for Co pay Savings Card.        Lois Sauceda  Pharmacy Patient Assistance Team

## 2025-01-23 ENCOUNTER — OUTPATIENT CASE MANAGEMENT (OUTPATIENT)
Dept: ADMINISTRATIVE | Facility: OTHER | Age: 30
End: 2025-01-23
Payer: COMMERCIAL

## 2025-01-24 NOTE — TELEPHONE ENCOUNTER
Have spoken to patient and informed her of her options for assistance due to the commercial plan through her employer. Patient expressed insulin co-pay at $35 with insurance plan, and not able to afford that co-pay. Informed patient there is currently no options available to make co-pay cheaper than available price on plan

## 2025-02-05 ENCOUNTER — OUTPATIENT CASE MANAGEMENT (OUTPATIENT)
Dept: ADMINISTRATIVE | Facility: OTHER | Age: 30
End: 2025-02-05
Payer: COMMERCIAL

## 2025-02-06 ENCOUNTER — OUTPATIENT CASE MANAGEMENT (OUTPATIENT)
Dept: ADMINISTRATIVE | Facility: OTHER | Age: 30
End: 2025-02-06
Payer: COMMERCIAL

## 2025-02-19 ENCOUNTER — OUTPATIENT CASE MANAGEMENT (OUTPATIENT)
Dept: ADMINISTRATIVE | Facility: OTHER | Age: 30
End: 2025-02-19
Payer: COMMERCIAL

## 2025-02-27 ENCOUNTER — OUTPATIENT CASE MANAGEMENT (OUTPATIENT)
Dept: ADMINISTRATIVE | Facility: OTHER | Age: 30
End: 2025-02-27
Payer: COMMERCIAL

## 2025-02-27 NOTE — PROGRESS NOTES
Outpatient Care Management  Plan of Care Follow Up Visit    Patient: Rubi Jose  MRN: [id number]  Date of Service: 02/27/2025  Completed by: Odette Martell RN  Referral Date: 01/11/2025    Reason for Visit   Patient presents with    OPCM RN Follow Up Call       Brief Summary: Phone contact made with Rubi lang to follow up and d/c from nursing portion of OPCM. Encouraged her to schedule a follow up appt with her PCP and she voiced understanding.   Next Steps: Monitor her chart and close OPCM episode when LCSW has completed her plan of care. Odette Martell RN

## 2025-03-05 ENCOUNTER — OUTPATIENT CASE MANAGEMENT (OUTPATIENT)
Dept: ADMINISTRATIVE | Facility: OTHER | Age: 30
End: 2025-03-05
Payer: COMMERCIAL

## 2025-03-05 NOTE — LETTER
Rubi Jose  [address] [address]      Dear Rubi Jose,     I am writing from the Outpatient Care Management Department at [facility]. I have been unsuccessful at reaching you since we spoke on 2/11.  I hope you found the assistance previously provided to you helpful.     Please contact me at [phone] from 8:00AM to 4:30 PM on Monday thru Friday.     As you know, [facility] also has a program with a nurse available 24/7 to answer questions or provide medical advice.  [facility] can be reached at [phone].    Sincerely,       Sugar Ward LMSW  Outpatient Care Management

## 2025-03-13 NOTE — PROGRESS NOTES
Outpatient Care Management   - Care Plan Follow Up    Patient: Rubi Jose  MRN:  [id number]  Date of Service:  3/13/2025  Completed by:  Sugar Ward LMSW  Referral Date: 01/11/2025    Reason for Visit   Patient presents with    Other     3/5/2025  1st attempt to complete Follow-Up  for OPCM, left message. Attempt letter sent.Collaborating with OPCM RN for successful patient contact.       OPCM SW Follow Up Call     03/13/2025  Case Closure       Brief Summary: Completed follow up with patient. Patient approved for medicaid, no longer needing Dexcom assistance. Denied FA by [facility]. No other needs or concerns. Agreeable to case closure. Will notify OPCM RN of case closure.     Complex Care Plan     Care plan was discussed and completed today with input from patient and/or caregiver.    Patient Instructions     No follow-ups on file.

## 2025-03-20 ENCOUNTER — OUTPATIENT CASE MANAGEMENT (OUTPATIENT)
Dept: ADMINISTRATIVE | Facility: OTHER | Age: 30
End: 2025-03-20
Payer: COMMERCIAL
